# Patient Record
Sex: FEMALE | Race: WHITE | Employment: STUDENT | URBAN - METROPOLITAN AREA
[De-identification: names, ages, dates, MRNs, and addresses within clinical notes are randomized per-mention and may not be internally consistent; named-entity substitution may affect disease eponyms.]

---

## 2020-01-09 ENCOUNTER — OFFICE VISIT (OUTPATIENT)
Dept: ORTHOPEDIC SURGERY | Age: 40
End: 2020-01-09

## 2020-01-09 VITALS
TEMPERATURE: 97.3 F | HEIGHT: 67 IN | RESPIRATION RATE: 16 BRPM | BODY MASS INDEX: 28.6 KG/M2 | SYSTOLIC BLOOD PRESSURE: 104 MMHG | OXYGEN SATURATION: 100 % | DIASTOLIC BLOOD PRESSURE: 72 MMHG | HEART RATE: 63 BPM | WEIGHT: 182.2 LBS

## 2020-01-09 DIAGNOSIS — M25.512 ACUTE PAIN OF LEFT SHOULDER: Primary | ICD-10-CM

## 2020-01-09 NOTE — PROGRESS NOTES
1. Have you been to the ER, urgent care clinic since your last visit? Hospitalized since your last visit? No    2. Have you seen or consulted any other health care providers outside of the [address] [place] [address] since your last visit? Include any pap smears or colon screening.  No

## 2020-01-09 NOTE — PATIENT INSTRUCTIONS
If we order a Diagnostic test (such as MRI or CT) during your office visit please see below:     Coordination of Care will be calling you to schedule your diagnostic test. If you have not heard from Coordination of Care within 5 business days, please call [phone]. Once you have a date scheduled for your diagnostic test, you will need to contact our office to schedule a follow up appointment, as this is when the physician will review your diagnostic test results with you. You can contact our office to schedule appointment by phone at [phone], or you can send a message via [facility] to request an appointment.

## 2020-01-09 NOTE — PROGRESS NOTES
Joaquín De La O  1980   Chief Complaint   Patient presents with    Shoulder Pain     left shoulder pain        HISTORY OF PRESENT ILLNESS  Joaquín De La O is a 46 y.o. female who presents today for evaluation of left shoulder. She states she has had a painful popping sensation in her shoulder x 2 years. Does not she dislocated her shoulder when she was a teenager. It hurts when she raises her arm and she can no longer work out secondary to the pain. Patient describes the pain as sharp, tearing, stabbing and popping that is Intermittent in nature. Symptoms are worse with exercise, lifting and carrying and is better with  rest.  Associated symptoms include weakness. Since problem started, it: has worsened. Pain does wake patient up at night. Has taken ibuprofen and has done shoulder therapy exercises for the problem. Pain is a 4/10. Has tried following treatments: Injections:NO; Brace:NO; Therapy:YES; Cane/Crutch:NO       Allergies   Allergen Reactions    Amoxicillin Rash        History reviewed. No pertinent past medical history.    Social History     Socioeconomic History    Marital status: UNKNOWN     Spouse name: Not on file    Number of children: Not on file    Years of education: Not on file    Highest education level: Not on file   Occupational History    Not on file   Social Needs    Financial resource strain: Not on file    Food insecurity:     Worry: Not on file     Inability: Not on file    Transportation needs:     Medical: Not on file     Non-medical: Not on file   Tobacco Use    Smoking status: Never Smoker    Smokeless tobacco: Never Used   Substance and Sexual Activity    Alcohol use: Not on file    Drug use: Not on file    Sexual activity: Not on file   Lifestyle    Physical activity:     Days per week: Not on file     Minutes per session: Not on file    Stress: Not on file   Relationships    Social connections:     Talks on phone: Not on file     Gets together: Not on file     Attends Synagogue service: Not on file     Active member of club or organization: Not on file     Attends meetings of clubs or organizations: Not on file     Relationship status: Not on file    Intimate partner violence:     Fear of current or ex partner: Not on file     Emotionally abused: Not on file     Physically abused: Not on file     Forced sexual activity: Not on file   Other Topics Concern    Not on file   Social History Narrative    Not on file      Past Surgical History:   Procedure Laterality Date    HX ACL RECONSTRUCTION        Family History   Problem Relation Age of Onset    No Known Problems Mother     No Known Problems Father       No current outpatient medications on file. No current facility-administered medications for this visit. REVIEW OF SYSTEM   Patient denies: Weight loss, Fever/Chills, HA, Visual changes, Fatigue, Chest pain, SOB, Abdominal pain, N/V/D/C, Blood in stool or urine, Edema. Pertinent positive as above in HPI. All others were negative    PHYSICAL EXAM:   Visit Vitals  /72 (BP 1 Location: Right arm, BP Patient Position: Sitting)   Pulse 63   Temp 97.3 °F (36.3 °C) (Oral)   Resp 16   Ht 5' 6.5\" (1.689 m)   Wt 182 lb 3.2 oz (82.6 kg)   SpO2 100%   BMI 28.97 kg/m²     The patient is a well-developed, well-nourished female   in no acute distress. The patient is alert and oriented times three. The patient is alert and oriented times three. Mood and affect are normal.  LYMPHATIC: lymph nodes are not enlarged and are within normal limits  SKIN: normal in color and non tender to palpation. There are no bruises or abrasions noted. NEUROLOGICAL: Motor sensory exam is within normal limits. Reflexes are equal bilaterally.  There is normal sensation to pinprick and light touch  MUSCULOSKELETAL:  Examination Left shoulder   Skin Intact   AC joint tenderness -   Biceps tenderness -   Forward flexion/Elevation    Active abduction  Glenohumeral abduction 90   External rotation ROM 90   Internal rotation ROM 70   Apprehension -   Cristines Relocation -   Jerk -   Load and Shift -   Obriens +   Speeds +   Impingement sign -   Supraspinatus/Empty Can -, 5/5   External Rotation Strength -, 5/5   Lift Off/Belly Press -, 5/5   Neurovascular Intact         IMAGING: 3 view xray images of left shoulder taken in office on 1/9/2020 read and reviewed by myself reveal no acute abnormalities     IMPRESSION:      ICD-10-CM ICD-9-CM    1. Acute pain of left shoulder M25.512 719.41 AMB POC XRAY, SHOULDER; COMPLETE, 2+      MRI SHOULDER LT W CONT      XR INJ SHOULDER LT PRE CT/MRI ARTHRO        PLAN:   1. Patient with shoulder pain with concerns for labral tear. Will order MRI arthrogram r/o labral tear  Risk factors include: n/a  2. No cortisone injection indicated today   3. No Physical/Occupational Therapy indicated today  4. Yes diagnostic test indicated today:   5. No durable medical equipment indicated today  6. No referral indicated today   7. No medications indicated today:   8.  No Narcotic indicated today    RTC after MRI     BARRY [facility]

## 2020-01-21 ENCOUNTER — HOSPITAL ENCOUNTER (OUTPATIENT)
Dept: GENERAL RADIOLOGY | Age: 40
Discharge: HOME | End: 2020-01-21
Attending: PHYSICIAN ASSISTANT
Payer: COMMERCIAL

## 2020-01-21 ENCOUNTER — HOSPITAL ENCOUNTER (OUTPATIENT)
Dept: MRI IMAGING | Age: 40
Discharge: HOME | End: 2020-01-21
Attending: PHYSICIAN ASSISTANT
Payer: COMMERCIAL

## 2020-01-21 DIAGNOSIS — M25.512 ACUTE PAIN OF LEFT SHOULDER: ICD-10-CM

## 2020-01-21 PROCEDURE — 73222 MRI JOINT UPR EXTREM W/DYE: CPT

## 2020-01-21 PROCEDURE — 77002 NEEDLE LOCALIZATION BY XRAY: CPT

## 2020-01-21 PROCEDURE — 74011636320 HC RX REV CODE- 636/320: Performed by: PHYSICIAN ASSISTANT

## 2020-01-21 PROCEDURE — 74011250636 HC RX REV CODE- 250/636: Performed by: PHYSICIAN ASSISTANT

## 2020-01-21 PROCEDURE — 74011000250 HC RX REV CODE- 250: Performed by: PHYSICIAN ASSISTANT

## 2020-01-21 PROCEDURE — A9575 INJ GADOTERATE MEGLUMI 0.1ML: HCPCS | Performed by: PHYSICIAN ASSISTANT

## 2020-01-21 RX ORDER — SODIUM CHLORIDE 9 MG/ML
3 INJECTION INTRAMUSCULAR; INTRAVENOUS; SUBCUTANEOUS
Status: COMPLETED | OUTPATIENT
Start: 2020-01-21 | End: 2020-01-21

## 2020-01-21 RX ORDER — GADOTERATE MEGLUMINE 376.9 MG/ML
20 INJECTION INTRAVENOUS
Status: COMPLETED | OUTPATIENT
Start: 2020-01-21 | End: 2020-01-21

## 2020-01-21 RX ORDER — LIDOCAINE HYDROCHLORIDE 10 MG/ML
10 INJECTION, SOLUTION EPIDURAL; INFILTRATION; INTRACAUDAL; PERINEURAL
Status: COMPLETED | OUTPATIENT
Start: 2020-01-21 | End: 2020-01-21

## 2020-01-21 RX ADMIN — IOHEXOL 50 ML: 240 INJECTION, SOLUTION INTRATHECAL; INTRAVASCULAR; INTRAVENOUS; ORAL at 11:40

## 2020-01-21 RX ADMIN — SODIUM CHLORIDE 10 ML: 9 INJECTION, SOLUTION INTRAMUSCULAR; INTRAVENOUS; SUBCUTANEOUS at 11:40

## 2020-01-21 RX ADMIN — GADOTERATE MEGLUMINE 20 ML: 376.9 INJECTION INTRAVENOUS at 11:41

## 2020-01-21 RX ADMIN — LIDOCAINE HYDROCHLORIDE 5 ML: 10 INJECTION, SOLUTION EPIDURAL; INFILTRATION; INTRACAUDAL; PERINEURAL at 11:39

## 2020-02-06 ENCOUNTER — TELEPHONE (OUTPATIENT)
Dept: ORTHOPEDIC SURGERY | Age: 40
End: 2020-02-06

## 2020-02-06 NOTE — TELEPHONE ENCOUNTER
Patient called very frustrated as she has seen the MRI results are final in \"my chart\", however, nobody has contacted her. She declined to schedule a follow up appointment and is instead requesting a call back. Please contact patient at [phone].

## 2020-02-12 NOTE — TELEPHONE ENCOUNTER
It is our policy that patient schedule a follow up after MRI as treatments are better discussed in person.

## 2020-02-20 ENCOUNTER — OFFICE VISIT (OUTPATIENT)
Dept: ORTHOPEDIC SURGERY | Age: 40
End: 2020-02-20

## 2020-02-20 VITALS
BODY MASS INDEX: 28.35 KG/M2 | DIASTOLIC BLOOD PRESSURE: 79 MMHG | HEART RATE: 79 BPM | HEIGHT: 67 IN | RESPIRATION RATE: 16 BRPM | OXYGEN SATURATION: 96 % | TEMPERATURE: 98.1 F | WEIGHT: 180.6 LBS | SYSTOLIC BLOOD PRESSURE: 113 MMHG

## 2020-02-20 DIAGNOSIS — M75.122 COMPLETE TEAR OF LEFT ROTATOR CUFF, UNSPECIFIED WHETHER TRAUMATIC: Primary | ICD-10-CM

## 2020-02-20 NOTE — PROGRESS NOTES
1. Have you been to the ER, urgent care clinic since your last visit? Hospitalized since your last visit? Yes When: January 29,2020 Where: [facility] Reason for visit: Cramping     2. Have you seen or consulted any other health care providers outside of the [address] [place] [address] since your last visit? Include any pap smears or colon screening.  No

## 2020-02-20 NOTE — PROGRESS NOTES
Aleksandra Schneider  1980   Chief Complaint   Patient presents with    Shoulder Pain     left shoulder pain        HISTORY OF PRESENT ILLNESS  Aleksandra Schneider is a 46 y.o. female who presents today for reevaluation of left shoulder pain and MRI review. Patient rates pain as 0/10 today. She states she has had a painful popping sensation in her shoulder x 2 years. Pt is fairly active and likes to lift weights, states she may have injured her shoulder while lifting weights. Does note she dislocated her shoulder when she was a teenager while surfing. It hurts when she raises her arm and she can no longer work out secondary to the pain. Pain at night. Pt reports that she was in the ER last week and was told she injured her L5. She also reports a hx of a cervical herniated disc. She has a follow-up appointment scheduled with a neurosurgeon. Surgery was discussed with the patient today and they would like to move forward with scheduling surgery. Patient denies any fever, chills, chest pain, shortness of breath or calf pain. The remainder of the review of systems is negative. There are no new illness or injuries to report since last seen in the office. There are no changes to medications, allergies, family or social history. PHYSICAL EXAM:   Visit Vitals  /79 (BP 1 Location: Right arm, BP Patient Position: Sitting)   Pulse 79   Temp 98.1 °F (36.7 °C) (Oral)   Resp 16   Ht 5' 6.5\" (1.689 m)   Wt 180 lb 9.6 oz (81.9 kg)   SpO2 96%   BMI 28.71 kg/m²     The patient is a well-developed, well-nourished female   in no acute distress. The patient is alert and oriented times three. The patient is alert and oriented times three. Mood and affect are normal.  LYMPHATIC: lymph nodes are not enlarged and are within normal limits  SKIN: normal in color and non tender to palpation. There are no bruises or abrasions noted. NEUROLOGICAL: Motor sensory exam is within normal limits. Reflexes are equal bilaterally. There is normal sensation to pinprick and light touch  MUSCULOSKELETAL:  Examination Left shoulder   Skin Intact   AC joint tenderness -   Biceps tenderness -   Forward flexion/Elevation    Active abduction    Glenohumeral abduction 90   External rotation ROM 60   Internal rotation ROM 45   Apprehension -   Cristines Relocation -   Jerk -   Load and Shift -   Obriens +   Speeds +   Impingement sign +   Supraspinatus/Empty Can -, 5/5   External Rotation Strength -, 5/5   Lift Off/Belly Press -, 5/5   Neurovascular Intact        IMAGING: MRI arthrogram of left shoulder dated 1/21/2020 reviewed and read by Dr. Martina Caruso:  1. Tendinosis of the left shoulder rotator cuff with focal area of  full-thickness tendon tear involving the anterior fibers of the supraspinatus  tendon. No significant tendon retraction. Normal rotator cuff muscle bulk and  signal.  2. Superior labral anterior-posterior tear. 3. Mild degenerative changes of the coracoid clavicular joint to include mild  pericapsular edema and small joint effusion. 4. Small quantity of fluid throughout the subacromial-subdeltoid bursa as can be  seen with symptoms referrable to bursitis. 3 view xray images of left shoulder taken in office on 1/9/2020 read and reviewed by MARK Montez, reveal: No acute abnormalities     IMPRESSION:      ICD-10-CM ICD-9-CM    1. Complete tear of left rotator cuff, unspecified whether traumatic M75.122 727.61         PLAN:   1. I discussed the risks and benefits and potential adverse outcomes of both operative vs non operative treatment of left complete rotator cuff tear with the patient. Patient wishes to proceed with arthroscopic left rotator cuff repair.      Risks of operative intervention include but not limited to bleeding, infection, deep vein thrombosis, pulmonary embolism, death, limb length discrepancy, reflexive sympathetic dystrophy, fat embolism syndrome,damage to blood vessels and nerves, malunion, non-union, delayed union, failure of hardware, post traumatic arthritis, stroke, heart attack, and death. Patient understands that infection may arise and may require numerous surgeries. History and physical exam scheduled for a later date. Risk factors include: n/a  2. No ultrasound exam indicated today  3. No cortisone injection indicated today   4. No Physical/Occupational Therapy indicated today  5. No diagnostic test indicated today:   6. No durable medical equipment indicated today  7. No referral indicated today   8. No medications indicated today:   9. No Narcotic indicated today       RTC H&P      Scribed by Charles Parson [address]) as dictated by Justin Bell MD    I, Dr. Justin Bell, confirm that all documentation is accurate.     Justin Bell M.D.   [facility]

## 2020-02-28 DIAGNOSIS — M75.122 COMPLETE TEAR OF LEFT ROTATOR CUFF, UNSPECIFIED WHETHER TRAUMATIC: Primary | ICD-10-CM

## 2020-03-03 ENCOUNTER — OFFICE VISIT (OUTPATIENT)
Dept: ORTHOPEDIC SURGERY | Age: 40
End: 2020-03-03

## 2020-03-03 VITALS
WEIGHT: 178 LBS | OXYGEN SATURATION: 98 % | DIASTOLIC BLOOD PRESSURE: 67 MMHG | SYSTOLIC BLOOD PRESSURE: 105 MMHG | RESPIRATION RATE: 16 BRPM | HEIGHT: 67 IN | BODY MASS INDEX: 27.94 KG/M2 | TEMPERATURE: 97.3 F | HEART RATE: 82 BPM

## 2020-03-03 DIAGNOSIS — M75.122 COMPLETE TEAR OF LEFT ROTATOR CUFF, UNSPECIFIED WHETHER TRAUMATIC: Primary | ICD-10-CM

## 2020-03-03 RX ORDER — GABAPENTIN 100 MG/1
100 CAPSULE ORAL 3 TIMES DAILY
COMMUNITY

## 2020-03-03 RX ORDER — GABAPENTIN 300 MG/1
300 CAPSULE ORAL
Qty: 5 CAP | Refills: 0 | Status: SHIPPED | OUTPATIENT
Start: 2020-03-03 | End: 2020-03-03 | Stop reason: CLARIF

## 2020-03-03 RX ORDER — OXYCODONE HYDROCHLORIDE 5 MG/1
5 TABLET ORAL
Qty: 40 TAB | Refills: 0 | Status: SHIPPED | OUTPATIENT
Start: 2020-03-03 | End: 2020-03-10

## 2020-03-03 NOTE — PROGRESS NOTES
HISTORY AND PHYSICAL          Patient: Blanquita Colvin                MRN: [id number]       SSN: [id number]  YOB: 1980          AGE: 46 y.o. SEX: female      Patient scheduled for:  Left shoulder arthroscopic rotator cuff repair    Surgeon: Julio César Cueva MD    ANESTHESIA TYPE:  General    HISTORY:     The patient was seen in the office today for a preoperative history and physical for an upcoming above listed surgery. The patient is a pleasant 46 y.o. female who has a history of left shoulder pain. Patient rates pain as 0/10 today. She states she has had a painful popping sensation in her shoulder x 2 years. Pt is fairly active and likes to lift weights, states she may have injured her shoulder while lifting weights. Does note she dislocated her shoulder when she was a teenager while surfing. It hurts when she raises her arm and she can no longer work out secondary to the pain. Pain at night. Due to the current findings, affected activity of daily living and continued pain and discomfort, surgical intervention is indicated. The alternatives, risks, and complications, including but not limited to infection, blood loss, need for blood transfusion, neurovascular damage, jaya-incisional numbness, subcutaneous hematoma, bone fracture, anesthetic complications, DVT, PE, death, RSD, postoperative stiffness and pain, possible surgical scar, delayed healing and nonhealing, reflexive sympathetic dystrophy, damage to blood vessels and nerves, need for more surgery, MI, and stroke,  failure of hardware, gait disturbances,have been discussed. The patient understands and wishes to proceed with surgery. PAST MEDICAL HISTORY:     History reviewed. No pertinent past medical history.     CURRENT MEDICATIONS:         ALLERGIES:     Allergies   Allergen Reactions    Amoxicillin Rash         SURGICAL HISTORY:     Past Surgical History:   Procedure Laterality Date    HX ACL RECONSTRUCTION SOCIAL HISTORY:     Social History     Socioeconomic History    Marital status: UNKNOWN     Spouse name: Not on file    Number of children: Not on file    Years of education: Not on file    Highest education level: Not on file   Tobacco Use    Smoking status: Never Smoker    Smokeless tobacco: Never Used       FAMILY HISTORY:     Family History   Problem Relation Age of Onset    No Known Problems Mother     No Known Problems Father        REVIEW OF SYSTEMS:     Negative for fevers, chills, chest pain, shortness of breath, weight loss, recent illness     General: Negative for fever and chills. No unexpected change in weight. Denies fatigue. No change in appetite. Skin: Negative for rash or itching. HEENT: Negative for congestion, sore throat, neck pain and neck stiffness. No change in vision or hearing. Hasn't noted any enlarged lymph nodes in the neck. Cardiovascular:  Negative for chest pain and palpitations. Has not noted pedal edema. Respiratory: Negative for cough, colds, sinus, hemoptysis, shortness of breath and wheezing. Gastrointestinal: Negative for nausea and vomiting, rectal bleeding, coffee ground emesis, abdominal pain, diarrhea and constipation. Genitourinary: Negative for dysuria, frequency urgency, or burning on micturition. No flank pain, no foul smelling urine, no difficulty with initiating urination. Hematological: Negative for bleeding or easy bruising. Musculoskeletal: Negative  for arthralgias, back pain or neck pain. Neurological: Negative for dizziness, seizures or syncopal episodes. Denies headaches. Endocrine: Denies excessive thirst.  No heat/cold intolerance. Psychiatric: Negative for depression or insomnia.     PHYSICAL EXAMINATION:     VITALS:   Visit Vitals  /67 (BP 1 Location: Left arm, BP Patient Position: Sitting)   Pulse 82   Temp 97.3 °F (36.3 °C) (Oral)   Resp 16   Ht 5' 6.5\" (1.689 m)   Wt 178 lb (80.7 kg)   SpO2 98%   BMI 28.30 kg/m² GEN:  Well developed, well nourished 46 y.o. female in no acute distress. HEENT: Normocephalic and atraumatic. Eyes: Conjunctivae and EOM are normal.Pupils are equal, round, and reactive to light. External ear normal appearance, external nose normal appearing. Mouth/Throat: Oropharynx is clear and moist, able to handle oral secretions w/out difficulty, airway patent  NECK: Supple. Normal ROM, No lymphadenopathy. Trachea is midline. No bruising, swelling or deformity  RESP: Clear to auscultation bilaterally. No wheezes, rales, rhonchi. Normal effort and breath sounds. No respiratory distress  CARDIO:  Normal rate, regular rhythm and normal heart sounds. No MGR. ABDOMEN: Soft, non-tender, non-distended, normoactive bowel sounds in all four quadrants. There is no tenderness. There is no rebound and no guarding. BACK: No CVA or spinal tenderness  BREAST:  Deferred  PELVIC:    Deferred   RECTAL:  Deferred   :           Deferred  EXTREMITIES: EXAMINATION OF: left shoulder  Examination Left shoulder   Skin Intact   AC joint tenderness -   Biceps tenderness -   Forward flexion/Elevation    Active abduction    Glenohumeral abduction 90   External rotation ROM 60   Internal rotation ROM 45   Apprehension -   Cristines Relocation -   Jerk -   Load and Shift -   Obriens +   Speeds +   Impingement sign +   Supraspinatus/Empty Can -, 5/5   External Rotation Strength -, 5/5   Lift Off/Belly Press -, 5/5   Neurovascular Intact           NEUROVASCULAR: Sensation intact to light touch and strength grossly intact and symmetrical. No nystagmus. Positive distal pulses and capillary refill. DVT ASSESSMENT:  There is not  calf tenderness. No evidence of DVT seen on physical exam.  MOTOR: In tact  PSYCH: Alert an oriented to person, place and time.  Mood, memory, affect, behavior and judgment normal       RADIOGRAPHS & DIAGNOSTIC STUDIES:     MRI/xray reveals : IMAGING: MRI arthrogram of left shoulder dated 1/21/2020 reviewed and read by Dr. Valdemar Johnson:  1. Tendinosis of the left shoulder rotator cuff with focal area of  full-thickness tendon tear involving the anterior fibers of the supraspinatus  tendon. No significant tendon retraction. Normal rotator cuff muscle bulk and  signal.  2. Superior labral anterior-posterior tear. 3. Mild degenerative changes of the coracoid clavicular joint to include mild  pericapsular edema and small joint effusion. 4. Small quantity of fluid throughout the subacromial-subdeltoid bursa as can be  seen with symptoms referrable to bursitis.    3 view xray images of left shoulder taken in office on 2020 read and reviewed by MARK Perez, reveal: No acute abnormalities         LABS:     None needed      ASSESSMENT:       Encounter Diagnosis   Name Primary?  Complete tear of left rotator cuff, unspecified whether traumatic Yes       PLAN:     Again, the alternatives, risks, and complications, as well as expected outcome were discussed. The patient understands and agrees to proceed with left shoulder arthroscopic rotator cuff repair.  Patient given orders listed below:    Orders Placed This Encounter    gabapentin (NEURONTIN) 100 mg capsule    oxyCODONE IR (ROXICODONE) 5 mg immediate release tablet    DISCONTD: gabapentin (NEURONTIN) 300 mg capsule         Avinash Samayoa PA-C  3/3/2020  8:38 AM

## 2020-03-17 ENCOUNTER — PATIENT MESSAGE (OUTPATIENT)
Dept: ORTHOPEDIC SURGERY | Age: 40
End: 2020-03-17

## 2020-03-17 ENCOUNTER — HOSPITAL ENCOUNTER (OUTPATIENT)
Dept: PHYSICAL THERAPY | Age: 40
Discharge: HOME | End: 2020-03-17
Payer: COMMERCIAL

## 2020-03-17 PROCEDURE — 97161 PT EVAL LOW COMPLEX 20 MIN: CPT

## 2020-03-17 PROCEDURE — 97140 MANUAL THERAPY 1/> REGIONS: CPT

## 2020-03-17 NOTE — PROGRESS NOTES
[address] [place] [address] PHYSICAL THERAPY AT [facility] [address] [place], [address] [facility] [address] - Phone: [phone]  Fax: [phone] / [address] [facility]  Patient Name: Cj Frausto : 1980   Treatment   Diagnosis: L shoulder pain Medical   Diagnosis: Left shoulder pain [M25.512]   Onset Date: 3/16/2020     Referral Source: [facility]): 3/17/2020   Prior Hospitalization: See medical history Provider #: [id number]   Prior Level of Function: Pt had chronic shoulder pain with ADLs. Comorbidities: None reported   Medications: Verified on Patient Summary List   The Plan of Care and following information is based on the information from the initial evaluation.   ==================================================================================  Assessment / key information:  Pt is a 45 yo female s/p L rotator cuff repair on 3/16/2020. He/she presents with pain ranging from 1-10/10, located L shoulder. Pain is made worse with using the arm, better with rest. GHJ PROM: flexion 90, [id number], ER at 0: 65.  Pt is limited in the following activities: using the L shoulder. Pt will benefit from PT interventions to address the aforementioned deficits and allow pt to return to OF.   Eval Complexity: History LOW Complexity : Zero comorbidities / personal factors that will impact the outcome / POC;  Examination  LOW Complexity : 1-2 Standardized tests and measures addressing body structure, function, activity limitation and / or participation in recreation ; Presentation LOW Complexity : Stable, uncomplicated ;  Decision Making MEDIUM Complexity : FOTO score of 26-74; Overall Complexity LOW    ==================================================================================  Problem List: pain affecting function, decrease ROM, decrease strength, impaired gait/ balance and decrease ADL/ functional yunes   Treatment Plan may include any combination of the following: Therapeutic exercise, Therapeutic activities, Neuromuscular re-education, Physical agent/modality, Manual therapy, Patient education and Self Care training  Patient / Family readiness to learn indicated by: asking questions, trying to perform skills and interest  Persons(s) to be included in education: patient (P)  Barriers to Learning/Limitations: no  Measures taken:    Patient Goal (s): \"recover\"   Patient self reported health status: good  Rehabilitation Potential: excellent   Short Term Goals: To be accomplished in  3  weeks:  1. Pt will be independent and compliant with HEP to decrease pain, increase ROM and return pt to PLOF. 2. PROM flexion >120deg     Long Term Goals: To be accomplished in  6  weeks:  1. Increase score on FOTO to > or = 60 to demo an increase in functional activity tolerance with the UE. 2.  Pt will note < or = 2/10 pain with all mobility to improve comfort with ADLs. 3. Pt to demonstrate a GROC score of >/= +5 to show overall improvement in function  Frequency / Duration:   Patient to be seen  3  times per week for 6  weeks:  Patient / Caregiver education and instruction: self care, activity modification and exercises    Therapist Signature: Terry Luciano, PT Date: 4/67/6814   Certification Period: - Time: 12:35 PM   ===========================================================================================  I certify that the above Physical Therapy Services are being furnished while the patient is under my care. I agree with the treatment plan and certify that this therapy is necessary. Physician Signature:        Date:       Time:     Please sign and return to [facility] or you may fax the signed copy to [phone]. Thank you.

## 2020-03-17 NOTE — PROGRESS NOTES
PHYSICAL THERAPY - DAILY TREATMENT NOTE    Patient Name: Elaina Conti        Date: 3/17/2020  : 1980    Patient  Verified: YES  Visit #:     Insurance: Payor: [address] [place] [address] Road / Plan: [address] / Product Type: Managed Care Medicaid /      In time: 12:00 Out time: 12:45   Total Treatment Time: 45     Medicare Time Tracking (below)   Total Timed Codes (min):  - 1:1 Treatment Time:  -     TREATMENT AREA/ DIAGNOSIS = Left shoulder pain [M25.512]    SUBJECTIVE  Pain Level (on 0 to 10 scale):  0  / 10   Medication Changes/New allergies or changes in medical history, any new surgeries or procedures?     NO    If yes, update Summary List   Subjective Functional Status/Changes:  []  No changes reported     See Eval      OBJECTIVE  Modalities Rationale:     decrease inflammation and decrease pain to improve patient's ability to perform ADLs without pain     min [] Estim, type/location:                                      []  att     []  unatt     []  w/US     []  w/ice    []  w/heat    min []  Mechanical Traction: type/lbs                   []  pro   []  sup   []  int   []  cont    []  before manual    []  after manual    min []  Ultrasound, settings/location:      min []  Iontophoresis w/ dexamethasone, location:                                               []  take home patch       []  in clinic   10 min [x]  Ice     []  Heat    location/position: L shoulder    min []  Vasopneumatic Device, press/temp:     min []  Other:    [] Skin assessment post-treatment (if applicable):    []  intact    []  redness- no adverse reaction     []redness  adverse reaction:        10 min Manual Therapy: PROM to L shoulder   Rationale:      increase ROM and increase tissue extensibility to improve patient's ability to perform ADLs without pain    Billed With/As:   [x] TE   [] TA   [] Neuro   [] Self Care Patient Education: [x] Review HEP    [] Progressed/Changed HEP based on:   [] positioning   [] body mechanics   [] transfers   [] heat/ice application    [] other:        Other Objective/Functional Measures:    See Eval   Post Treatment Pain Level (on 0 to 10) scale:   0  / 10     ASSESSMENT  Assessment/Changes in Function:     See Eval     []  See Progress Note/Recertification   Patient will continue to benefit from skilled PT services to modify and progress therapeutic interventions, address functional mobility deficits, address ROM deficits, address strength deficits and analyze and address soft tissue restrictions to attain remaining goals.    Progress toward goals / Updated goals:    See Eval     PLAN  [x]  Upgrade activities as tolerated YES Continue plan of care   []  Discharge due to :    []  Other:      Therapist: Zackary Malave DPT     Date: 3/17/2020 Time: 12:36 PM        Future Appointments   Date Time Provider Marsha Bill   3/23/2020  1:30 PM MARK Jaquez Mehrdad [address]

## 2020-03-17 NOTE — TELEPHONE ENCOUNTER
From: Rachel Orosco  To: Paula Rodriguez MD  Sent: 3/17/2020 8:54 AM EDT  Subject: Visit Follow-Up Question    I feel really good this morning. I am looking forward to PT. My question was I'm trying to let my arm relax, but I'm naturally tensing up. ...is there any way to lessen that? Maybe after today's PT it will help. Thought I'd ask though.

## 2020-03-19 ENCOUNTER — HOSPITAL ENCOUNTER (OUTPATIENT)
Dept: PHYSICAL THERAPY | Age: 40
Discharge: HOME | End: 2020-03-19
Payer: COMMERCIAL

## 2020-03-19 PROCEDURE — 97140 MANUAL THERAPY 1/> REGIONS: CPT

## 2020-03-19 NOTE — PROGRESS NOTES
PHYSICAL THERAPY - DAILY TREATMENT NOTE    Patient Name: Chandan Pearce        Date: 3/19/2020  : 1980    Patient  Verified: YES  Visit #:   2   of   12  Insurance: Payor: [address] / Plan: [address] / Product Type: Managed Care Medicaid /      In time: 8:50 Out time: 9:25   Total Treatment Time: 35     Medicare Time Tracking (below)   Total Timed Codes (min):  - 1:1 Treatment Time:  -     TREATMENT AREA/ DIAGNOSIS = Left shoulder pain [M25.512]    SUBJECTIVE  Pain Level (on 0 to 10 scale):  1  / 10   Medication Changes/New allergies or changes in medical history, any new surgeries or procedures? NO    If yes, update Summary List   Subjective Functional Status/Changes:  []  No changes reported     Pt reports that she took the pillow off the sling.  Pt states she is still keeping the sling on at all times      OBJECTIVE  Modalities Rationale:     decrease inflammation and decrease pain to improve patient's ability to perform ADLs without pain     min [] Estim, type/location:                                      []  att     []  unatt     []  w/US     []  w/ice    []  w/heat    min []  Mechanical Traction: type/lbs                   []  pro   []  sup   []  int   []  cont    []  before manual    []  after manual    min []  Ultrasound, settings/location:      min []  Iontophoresis w/ dexamethasone, location:                                               []  take home patch       []  in clinic   10 min [x]  Ice     []  Heat    location/position: L shoulder    min []  Vasopneumatic Device, press/temp:     min []  Other:    [] Skin assessment post-treatment (if applicable):    []  intact    []  redness- no adverse reaction     []redness  adverse reaction:        25 min Manual Therapy: PROM to L shoulder   Rationale:      increase ROM and increase tissue extensibility to improve patient's ability to perform ADLs without pain    Billed With/As:   [x] TE   [] TA   [] Neuro [] Self Care Patient Education: [x] Review HEP    [] Progressed/Changed HEP based on:   [] positioning   [] body mechanics   [] transfers   [] heat/ice application    [] other:        Other Objective/Functional Measures:    Pt PROM is [facility]   Post Treatment Pain Level (on 0 to 10) scale:   1  / 10     ASSESSMENT  Assessment/Changes in Function:     Pt had little guarding with PROM     []  See Progress Note/Recertification   Patient will continue to benefit from skilled PT services to modify and progress therapeutic interventions, address functional mobility deficits, address ROM deficits, address strength deficits and analyze and address soft tissue restrictions to attain remaining goals.    Progress toward goals / Updated goals:    Good Progress to    [] STG    [] LTG  1 as shown by improved mobility needed for ADLs     PLAN  [x]  Upgrade activities as tolerated YES Continue plan of care   []  Discharge due to :    []  Other:      Therapist: Cathy Sanabria DPT     Date: 3/19/2020 Time: 8:28 AM        Future Appointments   Date Time Provider Marsha Bill   3/19/2020  9:00 AM Patrick Been [facility]   3/20/2020 10:00 AM Natasha Lopes PTA [facility]   3/23/2020  8:00 AM Natasha Lopes PTA [facility]   3/23/2020  1:30 PM MARK Silva   3/25/2020 10:30 AM Patrick Been [facility]   3/27/2020  8:30 AM Natasha Lopes PTA [facility]   3/30/2020  9:00 AM Natasha Lopes PTA [facility]   4/1/2020 11:30 AM Patrick Been [facility]   4/3/2020 12:30 PM Patrick Been [facility]   4/6/2020 10:30 AM Patrick Been [facility]   4/8/2020 12:00 PM Patrick Been [facility]   4/10/2020  1:00 PM Patrick Been [facility]   4/13/2020 12:00 PM Patrick Been [facility]   4/15/2020 11:30 AM Patrick Been [facility]   4/17/2020  9:00 AM Natasha Lopes PTA [facility]   4/20/2020 11:00 AM Patrick Been [facility]   4/22/2020 11:30 AM Patrick Been [facility]   4/24/2020 12:30 PM Luis E Brown [facility] AT [facility]

## 2020-03-20 ENCOUNTER — HOSPITAL ENCOUNTER (OUTPATIENT)
Dept: PHYSICAL THERAPY | Age: 40
Discharge: HOME | End: 2020-03-20
Payer: COMMERCIAL

## 2020-03-20 PROCEDURE — 97140 MANUAL THERAPY 1/> REGIONS: CPT

## 2020-03-20 NOTE — PROGRESS NOTES
[facility] PHYSICAL THERAPY AT [address], [address] [place] [address], [address], [address] [place] [address] - Phone: [phone]  Fax: [phone]  PROGRESS NOTE  Patient Name: Cher Miranda : 1980   Treatment/Medical Diagnosis: Left shoulder pain [M25.512]   Referral Source: Shimon Bryson,*     Date of Initial Visit: 3/17/20 Attended Visits: 3 Missed Visits:      SUMMARY OF TREATMENT  Therapeutic ex including strengthening, ROM, flexibility, stabilization, manual therapy including: Patient education, HEP  CURRENT STATUS  Pt is making good  progress in therapy. Demonstrates PROM of (L) shld: Flexion 110 deg, Abd: 90 deg, ER: 45 deg at 45deg abd, IR: 70 deg, Pain is rated as 0-3/10. Pt reports con't use of meds for pain control, use of sling for (L) shld s/s. Goal/Measure of Progress Goal Met?   1.  1. Pt will be independent and compliant with HEP to decrease pain, increase ROM and return pt to PLOF. Status at last Eval: Dependent  Current Status: Independent yes   2.  2. PROM flexion >120deg   Status at last Eval: 90 deg Current Status: 110 deg yes     New Goals to be achieved in __3-4__  weeks:  1. Increase score on FOTO to > or = 60 to demo an increase in functional activity tolerance with the UE.     2.  2.  Pt will note < or = 2/10 pain with all mobility to improve comfort with ADLs. 3.  3. Pt to demonstrate a GROC score of >/= +5 to show overall improvement in function   RECOMMENDATIONS  Recommend con't PT services to progress RTC ROM and strength per protocol - 2-3x week for 4 weeks   If you have any questions/comments please contact us directly at [phone]. Thank you for allowing us to assist in the care of your patient.     Therapist Signature: Brennon Simmons DPT, CIMT Date: 3/20/2020     Time: 10:47 AM   NOTE TO PHYSICIAN:  PLEASE COMPLETE THE ORDERS BELOW AND FAX TO   [facility]: [phone]  If you are unable to process this request in 24 hours please contact our office: [phone]    ___ I have read the above report and request that my patient continue as recommended.   ___ I have read the above report and request that my patient continue therapy with the following changes/special instructions:_________________________________________________________   ___ I have read the above report and request that my patient be discharged from therapy.      Physician Signature:        Date:       Time:

## 2020-03-20 NOTE — PROGRESS NOTES
PHYSICAL THERAPY - DAILY TREATMENT NOTE      Patient Name: Reny Greco        Date: 3/20/2020  : 1980   YES Patient  Verified  Visit #:   3   of   12  Insurance: Payor: [address] / Plan: [address] / Product Type: Managed Care Medicaid /      In time: 1010 Out time: 248   Total Treatment Time: 35     Medicare Time Tracking (below)   Total Timed Codes (min):   1:1 Treatment Time:       TREATMENT AREA =  Left shoulder pain [M25.512]    SUBJECTIVE    Pain Level (on 0 to 10 scale):  1  / 10   Medication Changes/New allergies or changes in medical history, any new surgeries or procedures?     NO    If yes, update Summary List   Subjective Functional Status/Changes:  []  No changes reported     Reports soreness in (L) shld since last visit      Modalities Rationale:     decrease inflammation and decrease pain to improve patient's ability to perform ADLs   min [] Estim, type/location:                                      []  att     []  unatt     []  w/US     []  w/ice    []  w/heat    min []  Mechanical Traction: type/lbs                   []  pro   []  sup   []  int   []  cont    []  before manual    []  after manual    min []  Ultrasound, settings/location:      min []  Iontophoresis w/ dexamethasone, location:                                               []  take home patch       []  in clinic   10 min [x]  Ice     []  Heat    location/position:     min []  Vasopneumatic Device, press/temp:     min []  Other:    [x] Skin assessment post-treatment (if applicable):    [x]  intact    []  redness- no adverse reaction     []redness  adverse reaction:        25 min Manual Therapy: TPr to (L) post cuff, UT/LS PROM of (L) shld in all planes to patient tolerance    Rationale:      decrease pain, increase ROM, increase tissue extensibility and decrease trigger points to improve patient's ability to perform ADLs      Other Objective/Functional Measures:    Demonstrates increase in (L) shld PROM in all planes    Demonstrates PROM of (L) shld: Flexion 110 deg, Abd: 90 deg, ER: 45 deg at 45deg abd, IR: 70 deg      Post Treatment Pain Level (on 0 to 10) scale:   0 / 10     ASSESSMENT    Assessment/Changes in Function:     Pt to MD on Monday for f/u regarding (L) RTC shoulder      []  See Progress Note/Recertification   Patient will continue to benefit from skilled PT services to modify and progress therapeutic interventions, address functional mobility deficits, address ROM deficits, address strength deficits, analyze and address soft tissue restrictions, analyze and modify body mechanics/ergonomics and assess and modify postural abnormalities to attain remaining goals.       Progress toward goals / Updated goals:    Progressing with overall function and ADLs tolerance, see MD progress note      PLAN    []  Upgrade activities as tolerated YES Continue plan of care   []  Discharge due to :    []  Other:      Therapist: Ivonne Johnson PT    Date: 3/20/2020 Time: 10:42 AM     Future Appointments   Date Time Provider Marsha Bill   3/23/2020  8:00 AM Shiloh Salvador, PTA [facility]ENTER AT [facility]   3/23/2020  1:30 PM MARK Cast Mehrdad [address]   3/25/2020 10:30 AM Ami Pina [facility] AT [facility]   3/27/2020  8:30 AM Shiloh Salvador, PTA [facility] AT [facility]   3/30/2020  9:00 AM Shiloh Salvador, PTA [facility] AT [facility]   4/1/2020 11:30 AM Ami Pina [facility] AT [facility]   4/3/2020 12:30 PM Ami Pina [facility]   4/6/2020 10:30 AM Ami Pina [facility]   4/8/2020 12:00 PM Ami Pina [facility]   4/10/2020  1:00 PM Ami Pina [facility]   4/13/2020 12:00 PM Ami Pina [facility]   4/15/2020 11:30 AM Ami Pina [facility]   4/17/2020  9:00 AM Shiloh Salvador PTA [facility] AT [facility]   4/20/2020 11:00 AM Sussy Pina [facility] AT [facility]   4/22/2020 11:30 AM Sussy Pina [facility] AT [facility]   4/24/2020 12:30 PM Jitendra Celeste [facility] AT [facility]

## 2020-03-23 ENCOUNTER — OFFICE VISIT (OUTPATIENT)
Dept: ORTHOPEDIC SURGERY | Age: 40
End: 2020-03-23

## 2020-03-23 ENCOUNTER — HOSPITAL ENCOUNTER (OUTPATIENT)
Dept: PHYSICAL THERAPY | Age: 40
Discharge: HOME | End: 2020-03-23
Payer: COMMERCIAL

## 2020-03-23 VITALS
SYSTOLIC BLOOD PRESSURE: 91 MMHG | TEMPERATURE: 97.7 F | WEIGHT: 178 LBS | HEART RATE: 67 BPM | RESPIRATION RATE: 19 BRPM | BODY MASS INDEX: 27.94 KG/M2 | OXYGEN SATURATION: 99 % | DIASTOLIC BLOOD PRESSURE: 75 MMHG | HEIGHT: 67 IN

## 2020-03-23 DIAGNOSIS — M75.122 COMPLETE TEAR OF LEFT ROTATOR CUFF, UNSPECIFIED WHETHER TRAUMATIC: Primary | ICD-10-CM

## 2020-03-23 PROCEDURE — 97140 MANUAL THERAPY 1/> REGIONS: CPT

## 2020-03-23 NOTE — PROGRESS NOTES
PHYSICAL THERAPY - DAILY TREATMENT NOTE      Patient Name: Jackelyn Soliz        Date: 3/23/2020  : 1980   YES Patient  Verified  Visit #:   4   of   12  Insurance: Payor: [address] [place] [address] Road / Plan: [address] / Product Type: Managed Care Medicaid /      In time: 7:50 Out time: 8:25   Total Treatment Time: 35     Medicare Time Tracking (below)   Total Timed Codes (min):   1:1 Treatment Time:       TREATMENT AREA =  Left shoulder pain [M25.512]    SUBJECTIVE    Pain Level (on 0 to 10 scale):  0  / 10   Medication Changes/New allergies or changes in medical history, any new surgeries or procedures? NO    If yes, update Summary List   Subjective Functional Status/Changes:  []  No changes reported     \"Minimal to no pain. Able to get some sleep. Not using my arm. To MD this afternoon. \"       Modalities Rationale:     decrease inflammation and decrease pain to improve patient's ability to perform ADLs   min [] Estim, type/location:                                      []  att     []  unatt     []  w/US     []  w/ice    []  w/heat    min []  Mechanical Traction: type/lbs                   []  pro   []  sup   []  int   []  cont    []  before manual    []  after manual    min []  Ultrasound, settings/location:      min []  Iontophoresis w/ dexamethasone, location:                                               []  take home patch       []  in clinic   PD min [x]  Ice     []  Heat    location/position: L Shoulder    min []  Vasopneumatic Device, press/temp:     min []  Other:    [x] Skin assessment post-treatment (if applicable):    [x]  intact    []  redness- no adverse reaction     []redness  adverse reaction:        35 min Manual Therapy: TPr to (L) post cuff, UT/LS PROM of (L) shld in all planes to patient tolerance    Rationale:      decrease pain, increase ROM, increase tissue extensibility and decrease trigger points to improve patient's ability to perform ADLs      Other Objective/Functional Measures:     PROM of (L) shld: Flexion 110 deg, Abd: 90 deg, ER: 45 deg at 45deg abd, IR: 70 deg      Post Treatment Pain Level (on 0 to 10) scale:   0 / 10     ASSESSMENT    Assessment/Changes in Function:     Progressing well overall in ROM with minimal mm guarding and pain. Cont to educate pt on importance of protocol and precautions with good understanding. To MD this afternoon. []  See Progress Note/Recertification   Patient will continue to benefit from skilled PT services to modify and progress therapeutic interventions, address functional mobility deficits, address ROM deficits, address strength deficits, analyze and address soft tissue restrictions, analyze and modify body mechanics/ergonomics and assess and modify postural abnormalities to attain remaining goals. Progress toward goals / Updated goals:    Progressing with overall function and ADLs tolerance within protocol.       PLAN    []  Upgrade activities as tolerated YES Continue plan of care   []  Discharge due to :    []  Other:      Therapist: Elena Paulino PTA    Date: 3/23/2020 Time: 10:42 AM     Future Appointments   Date Time Provider Marsha Bill   3/23/2020  8:00 AM Raul Dyson PTA [facility]   3/23/2020  1:30 PM MARK [facility]   3/25/2020 10:30 AM Jeaneth Halo [facility]   3/27/2020  8:30 AM Raul Dyson PTA [facility] AT [facility]   3/30/2020  9:00 AM Raul Dyson PTA [facility] AT [facility]   4/1/2020 11:30 AM Jeaneth Halo [facility]   4/3/2020 12:30 PM Jeaneth Halo [facility]   4/6/2020 10:30 AM Jeaneth Halo [facility]   4/8/2020 12:00 PM Jeaneth Halo [facility]   4/10/2020  1:00 PM Jeaneth Halo [facility]   4/13/2020 12:00 PM Jeaneth Halo [facility]   4/15/2020 11:30 AM Jeaneth Halo [facility]   4/17/2020  9:00 AM Raul Dyson PTA [facility] AT [facility]   4/20/2020 11:00 AM Jeaneth Halo [facility]   4/22/2020 11:30 AM Jeaneth Halo [facility]   4/24/2020 12:30 PM Bigg Benson [facility] AT [facility]

## 2020-03-23 NOTE — PATIENT INSTRUCTIONS
You may now shower and get your incisions wet. We recommend starting scar massage in 1 week to your incision(s). Take Vitamin E, Cocoa Butter or Scar Cream and massage the incision 2 times a day. This will help soften your incisions and help de-sensitize the skin as the nerves \"wake up\". You may remove your sling in 4 weeks. You may then begin to move your arm on your own in 4 weeks. Continue with no lifting, pushing or pulling until you are seen again in 6 weeks Remember nothing causes an increase in pain including physical therapy.

## 2020-03-23 NOTE — PROGRESS NOTES
Maury Evans  1980     HISTORY OF PRESENT ILLNESS  Maury Evans is a 46 y.o. female who presents today for evaluation s/p Left shoulder arthroscopic rotator cuff repair on 3/16/2020. Patient has been going to PT. Describes pain as a 3/10. Has been taking nothing for pain. Still has night pain. Patient denies any fever, chills, chest pain, shortness of breath or calf pain. The remainder of the review of systems is negative. There are no new illness or injuries to report since last seen in the office. No changes in medications, allergies, social or family history. PHYSICAL EXAM:   Visit Vitals  BP 91/75 (BP 1 Location: Left arm)   Pulse 67   Temp 97.7 °F (36.5 °C) (Oral)   Resp 19   Ht 5' 6.5\" (1.689 m)   Wt 178 lb (80.7 kg)   SpO2 99%   BMI 28.30 kg/m²      The patient is a well-developed, well-nourished female in no acute distress. The patient is alert and oriented times three. The patient appears to be well groomed. Mood and affect are normal.  ORTHOPEDIC EXAM of left shoulder:  Inspection: swelling not present,  Bruising not present  Incision, clean, dry, intact, sutures in place  Passive glenohumeral abduction 0-40 degrees  Stability: Stable  Strength: n/a  2+ distal pulses    IMPRESSION:  S/P Left shoulder arthroscopic rotator cuff repair    PLAN:   Incisions cleaned. Surgery was discussed at length today. Patient to continue with PROM and PT. Continue wearing sling. Stressed to patient that nothing causes an increase in pain. Will start active assist in 3 weeks. arom in 4 weeks.  D/c sling in 4 weeks  RTC 6 weeks    BARRY Morgan [facility]

## 2020-03-25 ENCOUNTER — HOSPITAL ENCOUNTER (OUTPATIENT)
Dept: PHYSICAL THERAPY | Age: 40
Discharge: HOME | End: 2020-03-25
Payer: COMMERCIAL

## 2020-03-25 PROCEDURE — 97140 MANUAL THERAPY 1/> REGIONS: CPT

## 2020-03-25 NOTE — PROGRESS NOTES
PHYSICAL THERAPY - DAILY TREATMENT NOTE    Patient Name: Felecia Gupta        Date: 3/25/2020  : 1980    Patient  Verified: YES  Visit #:   5   of   12  Insurance: Payor: [address] [place] [address] Road / Plan: [address] / Product Type: Managed Care Medicaid /      In time: 10:30 Out time: 11:05   Total Treatment Time: 35     Medicare Time Tracking (below)   Total Timed Codes (min):  - 1:1 Treatment Time:  -     TREATMENT AREA/ DIAGNOSIS = Left shoulder pain [M25.512]    SUBJECTIVE  Pain Level (on 0 to 10 scale):  0  / 10   Medication Changes/New allergies or changes in medical history, any new surgeries or procedures? NO    If yes, update Summary List   Subjective Functional Status/Changes:  []  No changes reported     Pt reports that she is wearing the sling at all times.       OBJECTIVE  Modalities Rationale:     decrease inflammation and decrease pain to improve patient's ability to perform ADLs without pain     min [] Estim, type/location:                                      []  att     []  unatt     []  w/US     []  w/ice    []  w/heat    min []  Mechanical Traction: type/lbs                   []  pro   []  sup   []  int   []  cont    []  before manual    []  after manual    min []  Ultrasound, settings/location:      min []  Iontophoresis w/ dexamethasone, location:                                               []  take home patch       []  in clinic   10 min [x]  Ice     []  Heat    location/position: L shoulder    min []  Vasopneumatic Device, press/temp:     min []  Other:    [] Skin assessment post-treatment (if applicable):    []  intact    []  redness- no adverse reaction     []redness  adverse reaction:        25 min Manual Therapy: PROM to L shoulder   Rationale:      increase ROM and increase tissue extensibility to improve patient's ability to perform ADLs without pain    Billed With/As:   [x] TE   [] TA   [] Neuro   [] Self Care Patient Education: [x] Review HEP [] Progressed/Changed HEP based on:   [] positioning   [] body mechanics   [] transfers   [] heat/ice application    [] other:        Other Objective/Functional Measures:    Pt had no increase in pain. PROM is [facility]   Post Treatment Pain Level (on 0 to 10) scale:   0  / 10     ASSESSMENT  Assessment/Changes in Function:     Pt had little guarding with PROM     []  See Progress Note/Recertification   Patient will continue to benefit from skilled PT services to address functional mobility deficits, address ROM deficits and address strength deficits to attain remaining goals.    Progress toward goals / Updated goals:    Good Progress to    [] STG    [x] LTG  1 as shown by improved mobility needed for ADLs     PLAN  []  Upgrade activities as tolerated YES Continue plan of care   []  Discharge due to :    []  Other:      Therapist: Mc Montgomery DPT     Date: 3/25/2020 Time: 11:06 AM        Future Appointments   Date Time Provider Marsha Bill   3/27/2020  8:30 AM Ami Pina [facility]   3/30/2020  9:00 AM Ami Pina [facility]   4/1/2020 11:30 AM Ami Pina [facility]   4/3/2020 12:30 PM Ami Pina [facility]   4/6/2020 10:30 AM Ami Pina [facility]   4/8/2020 12:00 PM Ami Pina [facility]   4/10/2020  1:00 PM Ami Pina [facility]   4/13/2020 12:00 PM Ami Pina [facility]   4/15/2020 11:30 AM Ami Pina [facility]   4/17/2020  9:00 AM Gia Pelletier PT [facility]   4/20/2020 11:00 AM Ami Pina [facility]   4/22/2020 11:30 AM Ami Pina [facility]   4/24/2020 12:30 PM Jitendra Celeste Necessary [facility] AT [facility]

## 2020-03-27 ENCOUNTER — HOSPITAL ENCOUNTER (OUTPATIENT)
Dept: PHYSICAL THERAPY | Age: 40
Discharge: HOME | End: 2020-03-27
Payer: COMMERCIAL

## 2020-03-27 PROCEDURE — 97140 MANUAL THERAPY 1/> REGIONS: CPT

## 2020-03-27 NOTE — PROGRESS NOTES
PHYSICAL THERAPY - DAILY TREATMENT NOTE    Patient Name: Ana Scott        Date: 3/27/2020  : 1980    Patient  Verified: YES  Visit #:   6   of   12  [facility]: [facility] [address] [address] / Plan: [address] / Product Type: Managed Care Medicaid /      In time: 8:30 Out time: 9:05   Total Treatment Time: 35     Medicare Time Tracking (below)   Total Timed Codes (min):  - 1:1 Treatment Time:  -     TREATMENT AREA/ DIAGNOSIS = Left shoulder pain [M25.512]    SUBJECTIVE  Pain Level (on 0 to 10 scale):  0  / 10   Medication Changes/New allergies or changes in medical history, any new surgeries or procedures? NO    If yes, update Summary List   Subjective Functional Status/Changes:  []  No changes reported     Pt reports no pain and is using the sling at all times.   Difficulty with all ADLs that require the LUE      OBJECTIVE  Modalities Rationale:     decrease inflammation and decrease pain to improve patient's ability to perform ADLs without pain   min [] Estim, type/location:                                      []  att     []  unatt     []  w/US     []  w/ice    []  w/heat    min []  Mechanical Traction: type/lbs                   []  pro   []  sup   []  int   []  cont    []  before manual    []  after manual    min []  Ultrasound, settings/location:      min []  Iontophoresis w/ dexamethasone, location:                                               []  take home patch       []  in clinic   10 min [x]  Ice     []  Heat    location/position: L shoulder    min []  Vasopneumatic Device, press/temp:     min []  Other:    [] Skin assessment post-treatment (if applicable):    []  intact    []  redness- no adverse reaction     []redness  adverse reaction:        25 min Manual Therapy: PROM to L shoulder   Rationale:      increase ROM and increase tissue extensibility to improve patient's ability to perform ADLs without pain    Billed With/As:   [x] TE   [] TA   [] Neuro   [] Self Care Patient Education: [x] Review HEP    [] Progressed/Changed HEP based on:   [] positioning   [] body mechanics   [] transfers   [] heat/ice application    [] other:        Other Objective/Functional Measures:    PROM WFL   Post Treatment Pain Level (on 0 to 10) scale:   0  / 10     ASSESSMENT  Assessment/Changes in Function:     Pt had little guarding with PROM     []  See Progress Note/Recertification   Patient will continue to benefit from skilled PT services to modify and progress therapeutic interventions, address functional mobility deficits, address ROM deficits and address strength deficits to attain remaining goals.    Progress toward goals / Updated goals:    Good Progress to    [] STG    [x] LTG  1 as shown by improved mobility needed for ADLs     PLAN  [x]  Upgrade activities as tolerated YES Continue plan of care   []  Discharge due to :    []  Other:      Therapist: Allison Chawla DPT     Date: 3/27/2020 Time: 9:17 AM        Future Appointments   Date Time Provider Marsha Bill   3/30/2020  9:00 AM Jermaine Mandeep [facility]   4/1/2020 11:30 AM Jermaine Mandeep [facility]   4/3/2020 12:30 PM Jermaine Mandeep [facility]   4/6/2020 10:30 AM Jermaine Mandeep [facility]   4/8/2020 12:00 PM Jermaine Mandeep [facility]   4/10/2020  1:00 PM Jermaine Mandeep [facility]   4/13/2020 12:00 PM Jermaine Mandeep [facility]   4/15/2020 11:30 AM Jermaine Mandeep [facility]   4/17/2020  9:00 AM Delores Kaufman, PT [facility]   4/20/2020 11:00 AM Jermaine Mandeep [facility]   4/22/2020 11:30 AM Jermaine Mandeep [facility]   4/24/2020 12:30 PM Jermaine Mandeep [facility]

## 2020-03-30 ENCOUNTER — HOSPITAL ENCOUNTER (OUTPATIENT)
Dept: PHYSICAL THERAPY | Age: 40
Discharge: HOME | End: 2020-03-30
Payer: COMMERCIAL

## 2020-03-30 PROCEDURE — 97140 MANUAL THERAPY 1/> REGIONS: CPT

## 2020-03-30 NOTE — PROGRESS NOTES
PHYSICAL THERAPY - DAILY TREATMENT NOTE    Patient Name: Herbie Leigh        Date: 3/30/2020  : 1980    Patient  Verified: YES  Visit #:   7   of   16  Insurance: Payor: [address] [place] [address] Road / Plan: [address] / Product Type: Managed Care Medicaid /      In time: 9:00 Out time: 9:35   Total Treatment Time: 35     Medicare Time Tracking (below)   Total Timed Codes (min):  - 1:1 Treatment Time:  -     TREATMENT AREA/ DIAGNOSIS = Left shoulder pain [M25.512]    SUBJECTIVE  Pain Level (on 0 to 10 scale):  0  / 10   Medication Changes/New allergies or changes in medical history, any new surgeries or procedures?     NO    If yes, update Smmary List   Subjective Functional Status/Changes:  []  No changes reported     See PN      OBJECTIVE  Modalities Rationale:     decrease inflammation and decrease pain to improve patient's ability to perform ADLs without pain     min [] Estim, type/location:                                      []  att     []  unatt     []  w/US     []  w/ice    []  w/heat    min []  Mechanical Traction: type/lbs                   []  pro   []  sup   []  int   []  cont    []  before manual    []  after manual    min []  Ultrasound, settings/location:      min []  Iontophoresis w/ dexamethasone, location:                                               []  take home patch       []  in clinic   10 min [x]  Ice     []  Heat    location/position: L shoulder    min []  Vasopneumatic Device, press/temp:     min []  Other:    [] Skin assessment post-treatment (if applicable):    []  intact    []  redness- no adverse reaction     []redness  adverse reaction:          25 min Manual Therapy: PROM to L shoulder   Rationale:      increase ROM and increase tissue extensibility to improve patient's ability to perform ADLs without pain    Billed With/As:   [x] TE   [] TA   [] Neuro   [] Self Care Patient Education: [x] Review HEP    [] Progressed/Changed HEP based on:   [] positioning   [] body mechanics   [] transfers   [] heat/ice application    [] other:        Other Objective/Functional Measures:    See PN   Post Treatment Pain Level (on 0 to 10) scale:   0  / 10     ASSESSMENT  Assessment/Changes in Function:     See PN     []  See Progress Note/Recertification   Patient will continue to benefit from skilled PT services to modify and progress therapeutic interventions, address functional mobility deficits, address ROM deficits and address strength deficits to attain remaining goals.    Progress toward goals / Updated goals:    See PN     PLAN  [x]  Upgrade activities as tolerated YES Continue plan of care   []  Discharge due to :    []  Other:      Therapist: Vipul Hurt DPT     Date: 3/30/2020 Time: 7:50 AM        Future Appointments   Date Time Provider Marsha Bill   3/30/2020  9:00 AM Genetta Sprung [facility]   4/1/2020 11:30 AM Genetta Sprung [facility]   4/3/2020 12:30 PM Genetta Sprung [facility]   4/6/2020 10:30 AM Genetta Sprung [facility]   4/8/2020 12:00 PM Genetta Sprung [facility]   4/10/2020  1:00 PM Genetta Sprung [facility]   4/13/2020 12:00 PM Genetta Sprung [facility]   4/15/2020 11:30 AM Genetta Sprung [facility]   4/17/2020  9:00 AM Tammy Pak PT [facility]   4/20/2020 11:00 AM Genetta Sprung [facility]   4/22/2020 11:30 AM Genetta Sprung [facility]   4/24/2020 12:30 PM Genetta Sprung [facility]

## 2020-03-30 NOTE — PROGRESS NOTES
[facility] PHYSICAL THERAPY AT [facility] [address]  Phone: [phone]  Fax: [phone]  PROGRESS NOTE  Patient Name: Claus Rai : 1980   Treatment/Medical Diagnosis: Left shoulder pain [M25.512]   Referral Source: Junior Anguiano,*     Date of Initial Visit: 3/17/2020 Attended Visits: 7 Missed Visits: 0     SUMMARY OF TREATMENT  PT consisted of manual therapy techniques, therapeutic exercises, and modalities to improve strength, improve mobility, decrease pain, and improve overall function. CURRENT STATUS  Pt is s/p L RCR 3/16/2020. Pt has been seen for a total of 7 visits since onset of care. Pt is still wearing the sling at all times and is still PROM only. PROM flexion: 145deg  PROM abd: 110deg  PROM ER: 50deg      Goal/Measure of Progress Goal Met? 1. Increase score on FOTO to > or = 60 to demo an increase in functional activity tolerance with the UE. Status at last Eval: 32 Current Status: 40 Progressing   2.   Pt will note < or = 2/10 pain with all mobility to improve comfort with ADLs   Status at last Eval: 0-3 Current Status: 0 yes   3. Pt to demonstrate a GROC score of >/= +5 to show overall improvement in function   Status at last Eval: NA Current Status: +7 yes     New Goals to be achieved in __8__  weeks:  1. Continue goal #1   2. Pt to have shoulder PROM WFL to help with ADLs   3.  -   4.  -       RECOMMENDATIONS  Pt to continue PT 2-3x a week for 6 weeks to continue through current protocol. If you have any questions/comments please contact us directly at [phone]. Thank you for allowing us to assist in the care of your patient. Therapist Signature: Kaden Frye Date: 3/30/2020     Time: 7:52 AM   NOTE TO PHYSICIAN:  PLEASE COMPLETE THE ORDERS BELOW AND FAX TO   [facility] at [email or web address]: [phone].   If you are unable to process this request in 24 hours please contact our office: [phone].    ___ I have read the above report and request that my patient continue as recommended.   ___ I have read the above report and request that my patient continue therapy with the following changes/special instructions:_________________________________________________________   ___ I have read the above report and request that my patient be discharged from therapy.      Physician Signature:        Date:       Time:

## 2020-04-01 ENCOUNTER — HOSPITAL ENCOUNTER (OUTPATIENT)
Dept: PHYSICAL THERAPY | Age: 40
Discharge: HOME | End: 2020-04-01
Payer: MEDICAID

## 2020-04-01 PROCEDURE — 97140 MANUAL THERAPY 1/> REGIONS: CPT

## 2020-04-01 NOTE — PROGRESS NOTES
PHYSICAL THERAPY - DAILY TREATMENT NOTE    Patient Name: Shreyas Rojas        Date: 2020  : 1980    Patient  Verified: YES  Visit #:   8   of   24  [facility]: [facility] [address] / Plan: [address] / Product Type: Managed Care Medicaid /      In time: 11:30 Out time: 12:05   Total Treatment Time: 35     Medicare Time Tracking (below)   Total Timed Codes (min):  - 1:1 Treatment Time:  -     TREATMENT AREA/ DIAGNOSIS = Left shoulder pain [M25.512]    SUBJECTIVE  Pain Level (on 0 to 10 scale):  0  / 10   Medication Changes/New allergies or changes in medical history, any new surgeries or procedures? NO    If yes, update Summary List   Subjective Functional Status/Changes:  []  No changes reported     Pt reports staying in sling.  Still having toruble with all ADLs      OBJECTIVE  Modalities Rationale:     decrease inflammation and decrease pain to improve patient's ability to perform ADLs without pain     min [] Estim, type/location:                                      []  att     []  unatt     []  w/US     []  w/ice    []  w/heat    min []  Mechanical Traction: type/lbs                   []  pro   []  sup   []  int   []  cont    []  before manual    []  after manual    min []  Ultrasound, settings/location:      min []  Iontophoresis w/ dexamethasone, location:                                               []  take home patch       []  in clinic   10 min [x]  Ice     []  Heat    location/position: L shoulder    min []  Vasopneumatic Device, press/temp:     min []  Other:    [] Skin assessment post-treatment (if applicable):    []  intact    []  redness- no adverse reaction     []redness - adverse reaction:      25 min Manual Therapy: PROM to L shoulder   Rationale:      increase ROM and increase tissue extensibility to improve patient's ability to perform ADLs without pain    Billed With/As:   [x] TE   [] TA   [] Neuro   [] Self Care Patient Education: [x] Review HEP    [] Progressed/Changed HEP based on:   [] positioning   [] body mechanics   [] transfers   [] heat/ice application    [] other:        Other Objective/Functional Measures:    PROM WFL   Post Treatment Pain Level (on 0 to 10) scale:   0  / 10     ASSESSMENT  Assessment/Changes in Function:     Pt shwoing good mobility      []  See Progress Note/Recertification   Patient will continue to benefit from skilled PT services to modify and progress therapeutic interventions, address functional mobility deficits, address ROM deficits, address strength deficits and analyze and address soft tissue restrictions to attain remaining goals.    Progress toward goals / Updated goals:    Good Progress to    [] STG    [x] LTG  1 as shown by good mobility needed for ADLs     PLAN  [x]  Upgrade activities as tolerated YES Continue plan of care   []  Discharge due to :    []  Other:      Therapist: Concepción Wagner DPT     Date: 4/1/2020 Time: 10:35 AM        Future Appointments   Date Time Provider Marsha Bill   4/1/2020 11:30 AM Blayne Danube [facility]   4/3/2020 12:30 PM Blayne Danube [facility]   4/6/2020 10:30 AM Blayne Danube [facility]   4/8/2020 12:00 PM Blayne Danube [facility]   4/10/2020  1:00 PM Blayne Danube [facility]   4/13/2020 12:00 PM Blayne Danube [facility]   4/15/2020 11:30 AM Blayne Danube [facility]   4/17/2020  9:00 AM Zia Winchester PT [facility]   4/20/2020 11:00 AM Blayne Danube [facility]   4/22/2020 11:30 AM Blayne Danube [facility]   4/24/2020 12:30 PM Blayne Danube [facility]

## 2020-04-03 ENCOUNTER — HOSPITAL ENCOUNTER (OUTPATIENT)
Dept: PHYSICAL THERAPY | Age: 40
Discharge: HOME | End: 2020-04-03
Payer: MEDICAID

## 2020-04-03 PROCEDURE — 97140 MANUAL THERAPY 1/> REGIONS: CPT

## 2020-04-03 NOTE — PROGRESS NOTES
PHYSICAL THERAPY - DAILY TREATMENT NOTE    Patient Name: Rayshawn Wong        Date: 4/3/2020  : 1980    Patient  Verified: YES  Visit #:     Insurance: Payor: Jayna Aldrich / Plan: [address] / Product Type: HMO /      In time: 12:30 Out time: 1:10   Total Treatment Time: 40     Medicare Time Tracking (below)   Total Timed Codes (min):  - 1:1 Treatment Time:  -     TREATMENT AREA/ DIAGNOSIS = Left shoulder pain [M25.512]    SUBJECTIVE  Pain Level (on 0 to 10 scale):  0  / 10   Medication Changes/New allergies or changes in medical history, any new surgeries or procedures?     NO    If yes, update Summary List   Subjective Functional Status/Changes:  []  No changes reported     Pt reports no pain and still in the sling at all times      OBJECTIVE  Modalities Rationale:     decrease inflammation and decrease pain to improve patient's ability to perform ADLs without pain     min [] Estim, type/location:                                      []  att     []  unatt     []  w/US     []  w/ice    []  w/heat    min []  Mechanical Traction: type/lbs                   []  pro   []  sup   []  int   []  cont    []  before manual    []  after manual    min []  Ultrasound, settings/location:      min []  Iontophoresis w/ dexamethasone, location:                                               []  take home patch       []  in clinic    min []  Ice     []  Heat    location/position:     min []  Vasopneumatic Device, press/temp:     min []  Other:    [] Skin assessment post-treatment (if applicable):    []  intact    []  redness- no adverse reaction     []redness - adverse reaction:          30 min Manual Therapy: PROM to L shoulder   Rationale:      increase ROM and increase tissue extensibility to improve patient's ability to perform ADLs without pain    Other Objective/Functional Measures:    PROM WFL   Post Treatment Pain Level (on 0 to 10) scale:   0  / 10     ASSESSMENT  Assessment/Changes in Function: Little to no guarding during PROM     []  See Progress Note/Recertification   Patient will continue to benefit from skilled PT services to modify and progress therapeutic interventions, address functional mobility deficits, address ROM deficits and address strength deficits to attain remaining goals.    Progress toward goals / Updated goals:    Good Progress to    [] STG    [x] LTG  1 as shown by improved mobility needed for ADLs     PLAN  [x]  Upgrade activities as tolerated YES Continue plan of care   []  Discharge due to :    []  Other:      Therapist: Nicolas Tobar DPT     Date: 4/3/2020 Time: 11:21 AM        Future Appointments   Date Time Provider Marsha Bill   4/3/2020 12:30 PM Theola Cools [facility]   4/3/2020  2:30 PM Tulio Machado MD [facility]   4/6/2020 10:30 AM Theola Cools [facility]   4/8/2020 12:00 PM Theola Cools [facility]   4/10/2020  1:00 PM Theola Cools [facility]   4/13/2020 12:00 PM Theola Cools [facility]   4/15/2020 11:30 AM Theola Cools [facility]   4/17/2020  9:00 AM Todd Sauceda PT [facility]   4/20/2020 11:00 AM Theola Cools [facility]   4/22/2020 11:30 AM Theola Cools [facility]   4/24/2020 12:30 PM Theola Cools [facility]   5/5/2020  9:30 AM Robina Shen PA [address]

## 2020-04-06 ENCOUNTER — HOSPITAL ENCOUNTER (OUTPATIENT)
Dept: PHYSICAL THERAPY | Age: 40
Discharge: HOME | End: 2020-04-06
Payer: MEDICAID

## 2020-04-06 PROCEDURE — 97140 MANUAL THERAPY 1/> REGIONS: CPT

## 2020-04-06 NOTE — PROGRESS NOTES
PHYSICAL THERAPY - DAILY TREATMENT NOTE    Patient Name: Breanna Hull        Date: 2020  : 1980    Patient  Verified: YES  Visit #:   10   of   16  Insurance: Payor: [employer]ICA[employer] / Plan: [facility] [employer] / Product Type: Managed Care Medicaid /      In time: 10:00 Out time: 10:35   Total Treatment Time: 35     Medicare Time Tracking (below)   Total Timed Codes (min):  - 1:1 Treatment Time:  -     TREATMENT AREA/ DIAGNOSIS = Left shoulder pain [M25.512]    SUBJECTIVE  Pain Level (on 0 to 10 scale):  0  / 10   Medication Changes/New allergies or changes in medical history, any new surgeries or procedures? NO    If yes, update Summary List   Subjective Functional Status/Changes:  []  No changes reported     Pt reports that she is wearing her sling at all times.        OBJECTIVE  Modalities Rationale:     decrease inflammation to improve patient's ability to perform ADLs without pain     min [] Estim, type/location:                                      []  att     []  unatt     []  w/US     []  w/ice    []  w/heat    min []  Mechanical Traction: type/lbs                   []  pro   []  sup   []  int   []  cont    []  before manual    []  after manual    min []  Ultrasound, settings/location:      min []  Iontophoresis w/ dexamethasone, location:                                               []  take home patch       []  in clinic   10 min [x]  Ice     []  Heat    location/position: L shoulder    min []  Vasopneumatic Device, press/temp:     min []  Other:    [] Skin assessment post-treatment (if applicable):    []  intact    []  redness- no adverse reaction     []redness - adverse reaction:        25 min Manual Therapy: PROM to L shoulder   Rationale:      increase ROM to improve patient's ability to .pjds    Billed With/As:   [x] TE   [] TA   [] Neuro   [] Self Care Patient Education: [x] Review HEP    [] Progressed/Changed HEP based on:   [] positioning   [] body mechanics [] transfers   [] heat/ice application    [] other:        Other Objective/Functional Measures:    PROM WFL   Post Treatment Pain Level (on 0 to 10) scale:   0  / 10     ASSESSMENT  Assessment/Changes in Function:     Pt showing little guarding with PROM     []  See Progress Note/Recertification   Patient will continue to benefit from skilled PT services to modify and progress therapeutic interventions, address functional mobility deficits and address ROM deficits to attain remaining goals.    Progress toward goals / Updated goals:    Good Progress to    [] STG    [x] LTG  1 as shown by improved mobility needed for ADLs     PLAN  [x]  Upgrade activities as tolerated YES Continue plan of care   []  Discharge due to :    []  Other:      Therapist: Laine Berumen DPT     Date: 4/6/2020 Time: 10:34 AM        Future Appointments   Date Time Provider Marsha Bill   4/8/2020 12:30 PM Sebastian Senate [facility]   4/10/2020  1:00 PM [facility]   4/13/2020 12:00 PM [facility]   4/15/2020 11:30 AM Sebastian [facility]   4/17/2020  9:00 AM Siobhan Ledbetter PT [facility] AT [facility]   4/20/2020 11:00 AM [facility]   4/22/2020 11:30 AM Sebastian Senate [facility]   4/24/2020 12:30 PM Sebastian Senate [facility]   5/5/2020  9:30 AM MARK [address] 70

## 2020-04-08 ENCOUNTER — HOSPITAL ENCOUNTER (OUTPATIENT)
Dept: PHYSICAL THERAPY | Age: 40
Discharge: HOME | End: 2020-04-08
Payer: MEDICAID

## 2020-04-08 PROCEDURE — 97140 MANUAL THERAPY 1/> REGIONS: CPT

## 2020-04-08 NOTE — PROGRESS NOTES
PHYSICAL THERAPY - DAILY TREATMENT NOTE    Patient Name: Davy Dowell        Date: 2020  : 1980    Patient  Verified: YES  Visit #:     Insurance: Payor: [employer] MEDICAID / Plan: [facility] [employer] / Product Type: Managed Care Medicaid /      In time: 12:30 Out time: 1:05   Total Treatment Time: 35     Medicare Time Tracking (below)   Total Timed Codes (min):  - 1:1 Treatment Time:  -     TREATMENT AREA/ DIAGNOSIS = Left shoulder pain [M25.512]    SUBJECTIVE  Pain Level (on 0 to 10 scale):  0  / 10   Medication Changes/New allergies or changes in medical history, any new surgeries or procedures? NO    If yes, update Summary List   Subjective Functional Status/Changes:  []  No changes reported     Pt reports that her arm feels stiff.        OBJECTIVE  Modalities Rationale:     decrease inflammation and decrease pain to improve patient's ability to perform ADLs without pain     min [] Estim, type/location:                                      []  att     []  unatt     []  w/US     []  w/ice    []  w/heat    min []  Mechanical Traction: type/lbs                   []  pro   []  sup   []  int   []  cont    []  before manual    []  after manual    min []  Ultrasound, settings/location:      min []  Iontophoresis w/ dexamethasone, location:                                               []  take home patch       []  in clinic   10 min [x]  Ice     []  Heat    location/position: r shoulder    min []  Vasopneumatic Device, press/temp:     min []  Other:    [] Skin assessment post-treatment (if applicable):    []  intact    []  redness- no adverse reaction     []redness - adverse reaction:        25 min Manual Therapy: PROM to R shoulder   Rationale:      increase ROM and increase tissue extensibility to improve patient's ability to perform ADLs without pain    Billed With/As:   [x] TE   [] TA   [] Neuro   [] Self Care Patient Education: [x] Review HEP    [] Progressed/Changed HEP based on:   [] positioning   [] body mechanics   [] transfers   [] heat/ice application    [] other:        Other Objective/Functional Measures:    PROM WFL   Post Treatment Pain Level (on 0 to 10) scale:   0  / 10     ASSESSMENT  Assessment/Changes in Function:     Pt showing little to no guarding. Still PROM only     []  See Progress Note/Recertification   Patient will continue to benefit from skilled PT services to modify and progress therapeutic interventions, address functional mobility deficits, address ROM deficits and address strength deficits to attain remaining goals.    Progress toward goals / Updated goals:    Good Progress to    [] STG    [x] LTG  1 as shown by good mobility needed for ADLs     PLAN  []  Upgrade activities as tolerated YES Continue plan of care   []  Discharge due to :    []  Other:      Therapist: Brandie Garrido DPT     Date: 4/8/2020 Time: 2:20 PM        Future Appointments   Date Time Provider Marsha Bill   4/10/2020  1:00 PM [facility]   4/13/2020 12:00 PM [facility]   4/15/2020 11:30 AM [facility]   4/17/2020  9:00 AM Brandie Morelos PT [facility]   4/20/2020 11:00 AM [facility]   4/22/2020 11:30 AM [facility]   4/24/2020 12:30 PM [facility]   5/5/2020  9:30 AM Audrey Leung PA [address]

## 2020-04-10 ENCOUNTER — HOSPITAL ENCOUNTER (OUTPATIENT)
Dept: PHYSICAL THERAPY | Age: 40
Discharge: HOME | End: 2020-04-10
Payer: MEDICAID

## 2020-04-10 PROCEDURE — 97140 MANUAL THERAPY 1/> REGIONS: CPT

## 2020-04-10 NOTE — PROGRESS NOTES
PHYSICAL THERAPY - DAILY TREATMENT NOTE    Patient Name: Yolande Parmar        Date: 4/10/2020  : 1980    Patient  Verified: YES  Visit #:     Insurance: Payor: Paddy Ron / Plan: [id number] [place] [address] / Product Type: Managed Care Medicaid /      In time: 1:05 Out time: 1:40   Total Treatment Time: 35     Medicare Time Tracking (below)   Total Timed Codes (min):  - 1:1 Treatment Time:  -     TREATMENT AREA/ DIAGNOSIS = Left shoulder pain [M25.512]    SUBJECTIVE  Pain Level (on 0 to 10 scale):  0-1  / 10   Medication Changes/New allergies or changes in medical history, any new surgeries or procedures? NO    If yes, update Summary List   Subjective Functional Status/Changes:  []  No changes reported     Pt still in the sling at all times.        OBJECTIVE  Modalities Rationale:     decrease inflammation and decrease pain to improve patient's ability to perform ADLs without pain     min [] Estim, type/location:                                      []  att     []  unatt     []  w/US     []  w/ice    []  w/heat    min []  Mechanical Traction: type/lbs                   []  pro   []  sup   []  int   []  cont    []  before manual    []  after manual    min []  Ultrasound, settings/location:      min []  Iontophoresis w/ dexamethasone, location:                                               []  take home patch       []  in clinic   10 min [x]  Ice     []  Heat    location/position: L shoulder    min []  Vasopneumatic Device, press/temp:     min []  Other:    [] Skin assessment post-treatment (if applicable):    []  intact    []  redness- no adverse reaction     []redness - adverse reaction:        25 min Manual Therapy: PROM to L shoulder   Rationale:      increase ROM and increase tissue extensibility to improve patient's ability to perform ADLs without pain    Billed With/As:   [x] TE   [] TA   [] Neuro   [] Self Care Patient Education: [x] Review HEP    [] Progressed/Changed HEP based on:   [] positioning   [] body mechanics   [] transfers   [] heat/ice application    [] other:        Other Objective/Functional Measures:    PROM flexion: 165   Post Treatment Pain Level (on 0 to 10) scale:   0  / 10     ASSESSMENT  Assessment/Changes in Function:     Pt showing little guarding. Still limited with all ADLs due to immobility     []  See Progress Note/Recertification   Patient will continue to benefit from skilled PT services to modify and progress therapeutic interventions, address functional mobility deficits, address ROM deficits, address strength deficits and analyze and address soft tissue restrictions to attain remaining goals.    Progress toward goals / Updated goals:    Good Progress to    [] STG    [x] LTG  1 as shown by good mobility needed for ADLs     PLAN  [x]  Upgrade activities as tolerated YES Continue plan of care   []  Discharge due to :    []  Other:      Therapist: John Bajwa DPT     Date: 4/10/2020 Time: 1:00 PM        Future Appointments   Date Time Provider Marsha Bill   4/13/2020 12:00 PM Sonia Reno, PT [facility] AT [facility]   4/15/2020 11:30 AM Perla Sotomayor [facility] AT [facility]   4/17/2020  9:00 AM Sonia Reno PT [facility]   4/20/2020 11:00 AM Perla Sotomayor [facility]   4/22/2020 11:30 AM Perla Sotomayor [facility] AT [facility]   4/24/2020 12:30 PM Perla Sotomayor [facility] AT [facility]   5/5/2020  9:30 AM Mima Ocasio PA [address]

## 2020-04-13 ENCOUNTER — HOSPITAL ENCOUNTER (OUTPATIENT)
Dept: PHYSICAL THERAPY | Age: 40
Discharge: HOME | End: 2020-04-13
Payer: MEDICAID

## 2020-04-13 PROCEDURE — 97140 MANUAL THERAPY 1/> REGIONS: CPT

## 2020-04-13 NOTE — PROGRESS NOTES
PHYSICAL THERAPY - DAILY TREATMENT NOTE    Patient Name: Claus Rai        Date: 2020  : 1980    Patient  Verified: YES  Visit #:   15   of   24  Insurance: Payor: Marianna Nurse / Plan: [facility] HEALTHKEEPERS PLUS / Product Type: Managed Care Medicaid /      In time: 12:00 Out time: 12:35   Total Treatment Time: 35     Medicare Time Tracking (below)   Total Timed Codes (min):  - 1:1 Treatment Time:  -     TREATMENT AREA/ DIAGNOSIS = Left shoulder pain [M25.512]    SUBJECTIVE  Pain Level (on 0 to 10 scale):  0  / 10   Medication Changes/New allergies or changes in medical history, any new surgeries or procedures? NO    If yes, update Summary List   Subjective Functional Status/Changes:  []  No changes reported     Pt is still in the sling at all times. Pt able to begin [facility] next Monday.       OBJECTIVE  Modalities Rationale:     decrease inflammation and decrease pain to improve patient's ability to perform ADLs without pain     min [] Estim, type/location:                                      []  att     []  unatt     []  w/US     []  w/ice    []  w/heat    min []  Mechanical Traction: type/lbs                   []  pro   []  sup   []  int   []  cont    []  before manual    []  after manual    min []  Ultrasound, settings/location:      min []  Iontophoresis w/ dexamethasone, location:                                               []  take home patch       []  in clinic   10 min [x]  Ice     []  Heat    location/position: L shoulder    min []  Vasopneumatic Device, press/temp:     min []  Other:    [] Skin assessment post-treatment (if applicable):    []  intact    []  redness- no adverse reaction     []redness - adverse reaction:        25 min Manual Therapy: PROM to L shoulder   Rationale:      increase ROM and increase tissue extensibility to improve patient's ability to perform ADLs without pain    Billed With/As:   [] TE   [] TA   [] Neuro   [] Self Care Patient Education: [x] Review HEP    [] Progressed/Changed HEP based on:   [] positioning   [] body mechanics   [] transfers   [] heat/ice application    [] other:        Other Objective/Functional Measures:    PROM WFL \   Post Treatment Pain Level (on 0 to 10) scale:   0  / 10     ASSESSMENT  Assessment/Changes in Function:     Pt had little to no guarding  Able to progress protocol in 1 week     []  See Progress Note/Recertification   Patient will continue to benefit from skilled PT services to modify and progress therapeutic interventions, address functional mobility deficits, address ROM deficits, address strength deficits and analyze and address soft tissue restrictions to attain remaining goals.    Progress toward goals / Updated goals:    Good Progress to    [] STG    [x] LTG  1 as shown by good mobility needed for ADLs     PLAN  [x]  Upgrade activities as tolerated YES Continue plan of care   []  Discharge due to :    []  Other:      Therapist: Morgan Tadeo DPT     Date: 4/13/2020 Time: 12:34 PM        Future Appointments   Date Time Provider Marsha Bill   4/15/2020 11:30 AM Jonny Mott [facility]   4/17/2020  9:00 AM Constanza Hair PT [facility] AT [facility]   4/20/2020  3:00 PM [place] [facility]   4/22/2020 11:30 AM [place]   4/24/2020 12:30 PM Jonny Baliam [facility] AT [facility]   5/5/2020  9:30 AM Radha Rob PA [address]

## 2020-04-15 ENCOUNTER — HOSPITAL ENCOUNTER (OUTPATIENT)
Dept: PHYSICAL THERAPY | Age: 40
Discharge: HOME | End: 2020-04-15
Payer: MEDICAID

## 2020-04-15 PROCEDURE — 97140 MANUAL THERAPY 1/> REGIONS: CPT

## 2020-04-15 NOTE — PROGRESS NOTES
PHYSICAL THERAPY - DAILY TREATMENT NOTE    Patient Name: Claus Rai        Date: 4/15/2020  : 1980    Patient  Verified: YES  Visit #:   15   of   24  Insurance: Payor: Marianna Nurse / Plan: [facility] HEALTHKEEPERS PLUS / Product Type: Managed Care Medicaid /      In time: 11:30 Out time: 12:05   Total Treatment Time: 35     Medicare Time Tracking (below)   Total Timed Codes (min):  - 1:1 Treatment Time:  -     TREATMENT AREA/ DIAGNOSIS = Left shoulder pain [M25.512]    SUBJECTIVE  Pain Level (on 0 to 10 scale):  0  / 10   Medication Changes/New allergies or changes in medical history, any new surgeries or procedures?     NO    If yes, update Summary List   Subjective Functional Status/Changes:  []  No changes reported     Pt reports no pain and is using her sling at all times        OBJECTIVE  Modalities Rationale:     decrease inflammation and decrease pain to improve patient's ability to perform ADLs without pain     min [] Estim, type/location:                                      []  att     []  unatt     []  w/US     []  w/ice    []  w/heat    min []  Mechanical Traction: type/lbs                   []  pro   []  sup   []  int   []  cont    []  before manual    []  after manual    min []  Ultrasound, settings/location:      min []  Iontophoresis w/ dexamethasone, location:                                               []  take home patch       []  in clinic   10 min [x]  Ice     []  Heat    location/position: L shoulder    min []  Vasopneumatic Device, press/temp:     min []  Other:    [] Skin assessment post-treatment (if applicable):    []  intact    []  redness- no adverse reaction     []redness - adverse reaction:        25 min Manual Therapy: PROM to L shoulder   Rationale:      increase ROM and increase tissue extensibility to improve patient's ability to perform ADLs without pain      Billed With/As:   [x] TE   [] TA   [] Neuro   [] Self Care Patient Education: [x] Review HEP [] Progressed/Changed HEP based on:   [] positioning   [] body mechanics   [] transfers   [] heat/ice application    [] other:        Other Objective/Functional Measures:    PROM WFL   Post Treatment Pain Level (on 0 to 10) scale:   0  / 10     ASSESSMENT  Assessment/Changes in Function:     Pt showing little to no guarding     []  See Progress Note/Recertification   Patient will continue to benefit from skilled PT services to modify and progress therapeutic interventions, address functional mobility deficits, address ROM deficits and address strength deficits to attain remaining goals.    Progress toward goals / Updated goals:    Good Progress to    [] STG    [x] LTG  1 as shown by good mobility needed for ADLs     PLAN  [x]  Upgrade activities as tolerated YES Continue plan of care   []  Discharge due to :    []  Other:      Therapist: Bry Wisdom DPT     Date: 4/15/2020 Time: 12:07 PM        Future Appointments   Date Time Provider Marsha Bill   4/17/2020  9:00 AM John , PT [facility]   4/20/2020  3:00 PM [facility]   4/22/2020 11:30 AM [facility]   4/24/2020 12:30 PM [facility]   5/5/2020  9:30 AM Mckenzie Rae PA [address]

## 2020-04-17 ENCOUNTER — HOSPITAL ENCOUNTER (OUTPATIENT)
Dept: PHYSICAL THERAPY | Age: 40
Discharge: HOME | End: 2020-04-17
Payer: MEDICAID

## 2020-04-17 PROCEDURE — 97140 MANUAL THERAPY 1/> REGIONS: CPT

## 2020-04-17 NOTE — PROGRESS NOTES
PHYSICAL THERAPY - DAILY TREATMENT NOTE    Patient Name: Dorita Bidding        Date: 2020  : 1980    Patient  Verified: YES  Visit #:     Insurance: Payor: [employer] / Plan: [facility] [employer]ERS [employer] / Product Type: Managed Care Medicaid /      In time: 10:30 Out time: 11:05   Total Treatment Time: 35     Medicare Time Tracking (below)   Total Timed Codes (min):  - 1:1 Treatment Time:  -     TREATMENT AREA/ DIAGNOSIS = Left shoulder pain [M25.512]    SUBJECTIVE  Pain Level (on 0 to 10 scale):  0  / 10   Medication Changes/New allergies or changes in medical history, any new surgeries or procedures? NO    If yes, update Summary List   Subjective Functional Status/Changes:  []  No changes reported     Pt still in sling no using her arm.       OBJECTIVE  Modalities Rationale:     decrease inflammation and decrease pain to improve patient's ability to perform ADLs without pain     min [] Estim, type/location:                                      []  att     []  unatt     []  w/US     []  w/ice    []  w/heat    min []  Mechanical Traction: type/lbs                   []  pro   []  sup   []  int   []  cont    []  before manual    []  after manual    min []  Ultrasound, settings/location:      min []  Iontophoresis w/ dexamethasone, location:                                               []  take home patch       []  in clinic   10 min [x]  Ice     []  Heat    location/position: L shoulder    min []  Vasopneumatic Device, press/temp:     min []  Other:    [] Skin assessment post-treatment (if applicable):    []  intact    []  redness- no adverse reaction     []redness - adverse reaction:        25 min Manual Therapy: PROM to L shoulder   Rationale:      increase ROM and increase tissue extensibility to improve patient's ability to perform ADLs without pain    Billed With/As:   [x] TE   [] TA   [] Neuro   [] Self Care Patient Education: [x] Review HEP    [] Progressed/Changed HEP based on:   [] positioning   [] body mechanics   [] transfers   [] heat/ice application    [] other:        Other Objective/Functional Measures:    PROM WNL   Post Treatment Pain Level (on 0 to 10) scale:   0  / 10     ASSESSMENT  Assessment/Changes in Function:     Pt had little to no guarding during PROM. Pt able to progress to [facility] next visit per protocol     []  See Progress Note/Recertification   Patient will continue to benefit from skilled PT services to modify and progress therapeutic interventions, address functional mobility deficits, address ROM deficits, address strength deficits and analyze and address soft tissue restrictions to attain remaining goals.    Progress toward goals / Updated goals:    Good Progress to    [] STG    [x] LTG  1 as shown by good mobility needed for ADLs when allowed     PLAN  [x]  Upgrade activities as tolerated YES Continue plan of care   []  Discharge due to :    []  Other:      Therapist: Kaden Frye DPT     Date: 4/17/2020 Time: 8:56 AM        Future Appointments   Date Time Provider Marsha Bill   4/17/2020  9:00 AM [facility]   4/20/2020  3:00 PM [facility]   4/22/2020 11:30 AM [facility]   4/24/2020 12:30 PM [facility]   5/5/2020  9:30 AM MARK Wang [address]   6/11/2020  1:00 PM Earnestine Kinsey  [id number]

## 2020-04-20 ENCOUNTER — HOSPITAL ENCOUNTER (OUTPATIENT)
Dept: PHYSICAL THERAPY | Age: 40
Discharge: HOME | End: 2020-04-20
Payer: MEDICAID

## 2020-04-20 PROCEDURE — 97140 MANUAL THERAPY 1/> REGIONS: CPT

## 2020-04-20 PROCEDURE — 97110 THERAPEUTIC EXERCISES: CPT

## 2020-04-20 NOTE — PROGRESS NOTES
PHYSICAL THERAPY - DAILY TREATMENT NOTE    Patient Name: Cindi Saini        Date: 2020  : 1980    Patient  Verified: YES  Visit #:     Insurance: Payor: [employer] MEDICAID / Plan: [facility] [employer] / Product Type: Managed Care Medicaid /      In time: 3:00 Out time: 4:00   Total Treatment Time: 60     Medicare Time Tracking (below)   Total Timed Codes (min):  - 1:1 Treatment Time:  -     TREATMENT AREA/ DIAGNOSIS = Left shoulder pain [M25.512]    SUBJECTIVE  Pain Level (on 0 to 10 scale):  0  / 10   Medication Changes/New allergies or changes in medical history, any new surgeries or procedures? NO    If yes, update Summary List   Subjective Functional Status/Changes:  []  No changes reported     Pt reports that today is the first day she hasnt worn the sling.  Feels pretty good      OBJECTIVE  Modalities Rationale:     decrease inflammation and decrease pain to improve patient's ability to perform ADLs without pain     min [] Estim, type/location:                                      []  att     []  unatt     []  w/US     []  w/ice    []  w/heat    min []  Mechanical Traction: type/lbs                   []  pro   []  sup   []  int   []  cont    []  before manual    []  after manual    min []  Ultrasound, settings/location:      min []  Iontophoresis w/ dexamethasone, location:                                               []  take home patch       []  in clinic   10 min [x]  Ice     []  Heat    location/position: L shoulder    min []  Vasopneumatic Device, press/temp:     min []  Other:    [] Skin assessment post-treatment (if applicable):    []  intact    []  redness- no adverse reaction     []redness - adverse reaction:        35 min Therapeutic Exercise:  [x]  See flow sheet   Rationale:      increase strength and improve coordination to improve the patients ability to perform ADLs without pain       15 min Manual Therapy: PROM to L shoulder   Rationale: increase ROM and increase tissue extensibility to improve patient's ability to perform ADLs without pain    Billed With/As:   [x] TE   [] TA   [] Neuro   [] Self Care Patient Education: [x] Review HEP    [] Progressed/Changed HEP based on:   [] positioning   [] body mechanics   [] transfers   [] heat/ice application    [] other:        Other Objective/Functional Measures:    PROM WNL   Post Treatment Pain Level (on 0 to 10) scale:   0-  / 10     ASSESSMENT  Assessment/Changes in Function:     Initiated AAROM to L shoulder     []  See Progress Note/Recertification   Patient will continue to benefit from skilled PT services to modify and progress therapeutic interventions, address functional mobility deficits, address ROM deficits, address strength deficits and analyze and address soft tissue restrictions to attain remaining goals.    Progress toward goals / Updated goals:    Good Progress to    [] STG    [x] LTG  1 as shown by no pain with ADLs since out of sling     PLAN  [x]  Upgrade activities as tolerated YES Continue plan of care   []  Discharge due to :    []  Other:      Therapist: Amna Palacios DPT     Date: 4/20/2020 Time: 3:32 PM        Future Appointments   Date Time Provider Marsha Bill   4/22/2020 11:30 AM [facility]ough [facility]   4/24/2020 12:30 PM Magdaline Cough [facility]   5/5/2020  9:30 AM MARK [address]   6/11/2020  1:00 PM Irineo Peres  [address]

## 2020-04-22 ENCOUNTER — HOSPITAL ENCOUNTER (OUTPATIENT)
Dept: PHYSICAL THERAPY | Age: 40
Discharge: HOME | End: 2020-04-22
Payer: MEDICAID

## 2020-04-22 PROCEDURE — 97110 THERAPEUTIC EXERCISES: CPT

## 2020-04-22 PROCEDURE — 97140 MANUAL THERAPY 1/> REGIONS: CPT

## 2020-04-22 NOTE — PROGRESS NOTES
PHYSICAL THERAPY - DAILY TREATMENT NOTE    Patient Name: Butch Butler        Date: 2020  : 1980    Patient  Verified: YES  Visit #:     Insurance: Payor: BLUE CROSS MEDICAID / Plan: [facility] [employer] / Product Type: Managed Care Medicaid /      In time: 11:30 Out time: 12:15   Total Treatment Time: 45     Medicare Time Tracking (below)   Total Timed Codes (min):  - 1:1 Treatment Time:  -     TREATMENT AREA/ DIAGNOSIS = Left shoulder pain [M25.512]    SUBJECTIVE  Pain Level (on 0 to 10 scale):  0  / 10   Medication Changes/New allergies or changes in medical history, any new surgeries or procedures?     NO    If yes, update Summary List   Subjective Functional Status/Changes:  []  No changes reported     See PN      OBJECTIVE  Modalities Rationale:     decrease inflammation and decrease pain to improve patient's ability to perform ADLs without pain     min [] Estim, type/location:                                      []  att     []  unatt     []  w/US     []  w/ice    []  w/heat    min []  Mechanical Traction: type/lbs                   []  pro   []  sup   []  int   []  cont    []  before manual    []  after manual    min []  Ultrasound, settings/location:      min []  Iontophoresis w/ dexamethasone, location:                                               []  take home patch       []  in clinic   10 min [x]  Ice     []  Heat    location/position: L shoulder    min []  Vasopneumatic Device, press/temp:     min []  Other:    [] Skin assessment post-treatment (if applicable):    []  intact    []  redness- no adverse reaction     []redness - adverse reaction:        10 min Therapeutic Exercise:  [x]  See flow sheet   Rationale:      increase strength and improve coordination to improve the patients ability to perform ADLs without pain       25 min Manual Therapy: PROM to L shoulder   Rationale:      increase ROM and increase tissue extensibility to improve patient's ability to perform ADLs without pain    Billed With/As:   [x] TE   [] TA   [] Neuro   [] Self Care Patient Education: [x] Review HEP    [] Progressed/Changed HEP based on:   [] positioning   [] body mechanics   [] transfers   [] heat/ice application    [] other:        Other Objective/Functional Measures:    See PN   Post Treatment Pain Level (on 0 to 10) scale:   0  / 10     ASSESSMENT  Assessment/Changes in Function:     See PN     []  See Progress Note/Recertification   Patient will continue to benefit from skilled PT services to modify and progress therapeutic interventions, address functional mobility deficits, address ROM deficits, address strength deficits and analyze and address soft tissue restrictions to attain remaining goals.    Progress toward goals / Updated goals:    See PN     PLAN  [x]  Upgrade activities as tolerated YES Continue plan of care   []  Discharge due to :    []  Other:      Therapist: Brandie Garrido DPT     Date: 4/22/2020 Time: 10:48 AM        Future Appointments   Date Time Provider Marsha Bill   4/22/2020 11:30 AM Gleda Lands [facility]   4/24/2020 12:30 PM [facility] Lands [facility]   4/27/2020 11:30 AM [facility]a Lands [facility]   4/30/2020  4:00 PM Gleda Lands [facility]   5/4/2020 12:00 PM Gleda Lands [facility]   5/5/2020  9:30 AM MARK Delgado [address]   5/7/2020 12:00 PM Gleda Lands [facility]   5/11/2020 12:00 PM Gleda Lands [facility]   5/14/2020 12:00 PM Gleda Lands [facility]   5/18/2020 12:00 PM Gleda Lands [facility]   5/21/2020 12:00 PM Gleda Lands [facility]   5/26/2020 12:00 PM [facility] Lands [facility]   5/28/2020 12:00 PM [facility]a Lands [facility]   6/11/2020  1:00 PM Zarina Valenzuela  [address]

## 2020-04-22 NOTE — PROGRESS NOTES
[facility] PHYSICAL THERAPY AT [facility] [address] [place]  Phone: [phone]  Fax: [phone]  PROGRESS NOTE  Patient Name: Shiela Phoenix : 1980   Treatment/Medical Diagnosis: Left shoulder pain [M25.512]   Referral Source: [facility] Kiesha,*     Date of Initial Visit: 3/17/2020 Attended Visits: 17 Missed Visits: 0     SUMMARY OF TREATMENT  PT consisted of manual therapy techniques, therapeutic exercises, and modalities to improve strength, improve mobility, decrease pain, and improve overall function. CURRENT STATUS  Pt is s/p large rotator cuff repair on 3/16/2020. Pt is no longer wearing the sling. Pt has just initiated AAROM. Pt will initiate AROM next week per referral. Pt having no pain. Pt PROM is WNL. Goal/Measure of Progress Goal Met? 1. Increase score on FOTO to > or = 60 to demo an increase in functional activity tolerance with the UE. Status at last Eval: 32 Current Status: 52 Progressing   2. Pt will note < or = 2/10 pain with all mobility to improve comfort with ADLs   Status at last Eval: 1-3 Current Status: 0 yes   3. Pt to demonstrate a GROC score of >/= +5 to show overall improvement in function   Status at last Eval: NA Current Status: +7 yes     RECOMMENDATIONS  Pt to continue PT 2x a week for 4-6 more weeks to continue to progress through protocol. If you have any questions/comments please contact us directly at [phone]. Thank you for allowing us to assist in the care of your patient. Therapist Signature: Zak Reyes Date: 2020     Time: 11:29 AM   NOTE TO PHYSICIAN:  PLEASE COMPLETE THE ORDERS BELOW AND FAX TO   [facility] at [email or web address]: [phone].   If you are unable to process this request in 24 hours please contact our office: [phone].    ___ I have read the above report and request that my patient continue as recommended.   ___ I have read the above report and request that my patient continue therapy with the following changes/special instructions:_________________________________________________________   ___ I have read the above report and request that my patient be discharged from therapy.      Physician Signature:        Date:       Time:

## 2020-04-24 ENCOUNTER — HOSPITAL ENCOUNTER (OUTPATIENT)
Dept: PHYSICAL THERAPY | Age: 40
Discharge: HOME | End: 2020-04-24
Payer: MEDICAID

## 2020-04-24 PROCEDURE — 97140 MANUAL THERAPY 1/> REGIONS: CPT

## 2020-04-24 PROCEDURE — 97110 THERAPEUTIC EXERCISES: CPT

## 2020-04-24 NOTE — PROGRESS NOTES
PHYSICAL THERAPY - DAILY TREATMENT NOTE    Patient Name: Cindi Saini        Date: 2020  : 1980    Patient  Verified: YES  Visit #:     Insurance: Payor: [employer]OSS MEDICAID / Plan: [facility] [employer]KEEPERS PLUS / Product Type: Managed Care Medicaid /      In time: 12:35 Out time: 1:20   Total Treatment Time: 45     Medicare Time Tracking (below)   Total Timed Codes (min):  - 1:1 Treatment Time:  -     TREATMENT AREA/ DIAGNOSIS = Left shoulder pain [M25.512]    SUBJECTIVE  Pain Level (on 0 to 10 scale):  0  / 10   Medication Changes/New allergies or changes in medical history, any new surgeries or procedures?     NO    If yes, update Summary List   Subjective Functional Status/Changes:  []  No changes reported     Pt reports that she is still not using the arm      OBJECTIVE  Modalities Rationale:     decrease inflammation and decrease pain to improve patient's ability to perform ADLs without pain     min [] Estim, type/location:                                      []  att     []  unatt     []  w/US     []  w/ice    []  w/heat    min []  Mechanical Traction: type/lbs                   []  pro   []  sup   []  int   []  cont    []  before manual    []  after manual    min []  Ultrasound, settings/location:      min []  Iontophoresis w/ dexamethasone, location:                                               []  take home patch       []  in clinic   10 min [x]  Ice     []  Heat    location/position: L shoulder    min []  Vasopneumatic Device, press/temp:     min []  Other:    [] Skin assessment post-treatment (if applicable):    []  intact    []  redness- no adverse reaction     []redness - adverse reaction:        25 min Therapeutic Exercise:  [x]  See flow sheet   Rationale:      increase strength and improve coordination to improve the patients ability to perform ADLs without pain       10 min Manual Therapy: PROM to L shoulder   Rationale:      increase ROM and increase tissue extensibility to improve patient's ability to perform ADLs without pain    Billed With/As:   [x] TE   [] TA   [] Neuro   [] Self Care Patient Education: [x] Review HEP    [] Progressed/Changed HEP based on:   [] positioning   [] body mechanics   [] transfers   [] heat/ice application    [] other:        Other Objective/Functional Measures:    PROM full     Post Treatment Pain Level (on 0 to 10) scale:   0  / 10     ASSESSMENT  Assessment/Changes in Function:     Pt showing improved tolerance to AAROM. Unable to actively use the LUE for ADLs     []  See Progress Note/Recertification   Patient will continue to benefit from skilled PT services to modify and progress therapeutic interventions, address functional mobility deficits, address ROM deficits and address strength deficits to attain remaining goals.    Progress toward goals / Updated goals:    Good Progress to    [] STG    [x] LTG  1 as shown by improved mobility needed for ADLs     PLAN  [x]  Upgrade activities as tolerated YES Continue plan of care   []  Discharge due to :    []  Other:      Therapist: Roxana Allison DPT     Date: 4/24/2020 Time: 10:16 AM        Future Appointments   Date Time Provider Marsha Bill   4/24/2020 12:30 PM Samia Rattler [facility] AT [facility]   4/27/2020 11:30 AM Samia Rattler [facility]   4/30/2020  4:00 PM Samia Rattler [facility]   5/4/2020 12:00 PM [facility]ia Rattler [facility]   5/5/2020  9:30 AM MARK Humphreys   5/7/2020 12:00 PM Samia Rattler [facility]   5/11/2020 12:00 PM Samia Rattler [facility]   5/14/2020 12:00 PM Samia Rattler [facility]   5/18/2020 12:00 PM Samia Rattler [facility]   5/21/2020 12:00 PM Samia Rattler [facility]   5/26/2020 12:00 PM Samia Dotson [facility]   5/28/2020 12:00 PM [facility] Dotson [facility]   6/11/2020  1:00 PM Jacque Krishna  [address]

## 2020-04-27 ENCOUNTER — HOSPITAL ENCOUNTER (OUTPATIENT)
Dept: PHYSICAL THERAPY | Age: 40
Discharge: HOME | End: 2020-04-27
Payer: MEDICAID

## 2020-04-27 PROCEDURE — 97140 MANUAL THERAPY 1/> REGIONS: CPT

## 2020-04-27 PROCEDURE — 97110 THERAPEUTIC EXERCISES: CPT

## 2020-04-27 NOTE — PROGRESS NOTES
PHYSICAL THERAPY - DAILY TREATMENT NOTE    Patient Name: Shreyas Rojas        Date: 2020  : 1980    Patient  Verified: YES  Visit #:     Insurance: Payor: Greg Simmons / Plan: [facility] HEALTHKEEPERS PLUS / Product Type: Managed Care Medicaid /      In time: 11:25 Out time: 12:10   Total Treatment Time: 45     Medicare Time Tracking (below)   Total Timed Codes (min):  - 1:1 Treatment Time:  -     TREATMENT AREA/ DIAGNOSIS = Left shoulder pain [M25.512]    SUBJECTIVE  Pain Level (on 0 to 10 scale):  0  / 10   Medication Changes/New allergies or changes in medical history, any new surgeries or procedures?     NO    If yes, update Summary List   Subjective Functional Status/Changes:  []  No changes reported     Pt reports difficulty with using the LUE with ADLs      OBJECTIVE  Modalities Rationale:     decrease inflammation and decrease pain to improve patient's ability to perform ADLs without pain     min [] Estim, type/location:                                      []  att     []  unatt     []  w/US     []  w/ice    []  w/heat    min []  Mechanical Traction: type/lbs                   []  pro   []  sup   []  int   []  cont    []  before manual    []  after manual    min []  Ultrasound, settings/location:      min []  Iontophoresis w/ dexamethasone, location:                                               []  take home patch       []  in clinic   10 min [x]  Ice     []  Heat    location/position: L shoulder    min []  Vasopneumatic Device, press/temp:     min []  Other:    [] Skin assessment post-treatment (if applicable):    []  intact    []  redness- no adverse reaction     []redness - adverse reaction:        25 min Therapeutic Exercise:  [x]  See flow sheet   Rationale:      increase strength and improve coordination to improve the patients ability to .,pjd       10 min Manual Therapy: PROM to L shoulder   Rationale:      increase ROM and increase tissue extensibility to improve patient's ability to perform ADLs without pain    Billed With/As:   [x] TE   [] TA   [] Neuro   [] Self Care Patient Education: [x] Review HEP    [] Progressed/Changed HEP based on:   [] positioning   [] body mechanics   [] transfers   [] heat/ice application    [] other:        Other Objective/Functional Measures:    PROM WNL   Post Treatment Pain Level (on 0 to 10) scale:   0  / 10     ASSESSMENT  Assessment/Changes in Function:     Pt showing improved function of the LUE. Unable to reach overhead still      []  See Progress Note/Recertification   Patient will continue to benefit from skilled PT services to modify and progress therapeutic interventions, address functional mobility deficits, address ROM deficits, address strength deficits and analyze and address soft tissue restrictions to attain remaining goals.    Progress toward goals / Updated goals:    Fair Progress to    [] STG    [x] LTG  1 as shown by improved mobility needed for ADLs     PLAN  [x]  Upgrade activities as tolerated YES Continue plan of care   []  Discharge due to :    []  Other:      Therapist: Osbaldo Cary DPT     Date: 4/27/2020 Time: 12:11 PM        Future Appointments   Date Time Provider Marsha Bill   4/30/2020  4:00 PM [facility]   5/4/2020 12:00 PM [facility]   5/5/2020  9:30 AM MARK Matthew [address]   5/7/2020 12:00 PM [facility]   5/11/2020 12:00 PM [facility]   5/14/2020 12:00 PM [facility]   5/18/2020 12:00 PM [facility]   5/21/2020 12:00 PM [facility]   5/26/2020 12:00 PM [facility]   5/28/2020 12:00 PM [facility]   6/11/2020  1:00 PM Kamaljit Mcfadden  [address]

## 2020-04-30 ENCOUNTER — HOSPITAL ENCOUNTER (OUTPATIENT)
Dept: PHYSICAL THERAPY | Age: 40
Discharge: HOME | End: 2020-04-30
Payer: MEDICAID

## 2020-04-30 PROCEDURE — 97110 THERAPEUTIC EXERCISES: CPT

## 2020-04-30 PROCEDURE — 97140 MANUAL THERAPY 1/> REGIONS: CPT

## 2020-04-30 NOTE — PROGRESS NOTES
PHYSICAL THERAPY - DAILY TREATMENT NOTE    Patient Name: Kathie Galindo        Date: 2020  : 1980    Patient  Verified: YES  Visit #:     Insurance: Payor: [employer] MEDICAID / Plan: [facility] HEALTHKEEPERS PLUS / Product Type: Managed Care Medicaid /      In time: 3:55 Out time: 4:45   Total Treatment Time: 50     Medicare Time Tracking (below)   Total Timed Codes (min):  - 1:1 Treatment Time:  -     TREATMENT AREA/ DIAGNOSIS = Left shoulder pain [M25.512]    SUBJECTIVE  Pain Level (on 0 to 10 scale):  0  / 10   Medication Changes/New allergies or changes in medical history, any new surgeries or procedures? NO    If yes, update Summary List   Subjective Functional Status/Changes:  []  No changes reported     Pt reports that she cant reach overhead       OBJECTIVE  Modalities Rationale:     decrease inflammation to improve patient's ability to perform ADLs without pain     min [] Estim, type/location:                                      []  att     []  unatt     []  w/US     []  w/ice    []  w/heat    min []  Mechanical Traction: type/lbs                   []  pro   []  sup   []  int   []  cont    []  before manual    []  after manual    min []  Ultrasound, settings/location:      min []  Iontophoresis w/ dexamethasone, location:                                               []  take home patch       []  in clinic   10 min [x]  Ice     []  Heat    location/position: L shoulder    min []  Vasopneumatic Device, press/temp:     min []  Other:    [] Skin assessment post-treatment (if applicable):    []  intact    []  redness- no adverse reaction     []redness - adverse reaction:        30 min Therapeutic Exercise:  [x]  See flow sheet   Rationale:      increase strength and improve coordination to improve the patients ability to perform ADLs without pain       10 min Manual Therapy: Rhythmic stabs.  PROM to L shoulder   Rationale:      increase ROM and increase tissue extensibility to improve patient's ability to perform ADLs without pain    Billed With/As:   [x] TE   [] TA   [] Neuro   [] Self Care Patient Education: [x] Review HEP    [] Progressed/Changed HEP based on:   [] positioning   [] body mechanics   [] transfers   [] heat/ice application    [] other:        Other Objective/Functional Measures:    PROM full  Initiated AAROM flexion standing    Post Treatment Pain Level (on 0 to 10) scale:   0  / 10     ASSESSMENT  Assessment/Changes in Function:     Pt still unable to use L UE for ADLs     []  See Progress Note/Recertification   Patient will continue to benefit from skilled PT services to modify and progress therapeutic interventions, address functional mobility deficits, address ROM deficits and address strength deficits to attain remaining goals.    Progress toward goals / Updated goals:    Good Progress to    [] STG    [x] LTG  1 as shown by good mobility needed for overhead ADLs     PLAN  [x]  Upgrade activities as tolerated YES Continue plan of care   []  Discharge due to :    []  Other:      Therapist: Aron Ennis DPT     Date: 4/30/2020 Time: 4:42 PM        Future Appointments   Date Time Provider Marsha Bill   5/5/2020  9:30 AM MARK [address]   [facility]   [facility]2020 [facility]   5/12/2020 12:[facility]   5/14/2020 [facility]   5/19/2020 12:00 PM [facility]   5/21/2020 12:00 PM [facility]   5/26/2020 [facility]   5/28/2020 [facility]   6/11/2020  1:00 PM Milton Case  [address]

## 2020-05-04 ENCOUNTER — APPOINTMENT (OUTPATIENT)
Dept: PHYSICAL THERAPY | Age: 40
End: 2020-05-04
Payer: MEDICAID

## 2020-05-05 ENCOUNTER — HOSPITAL ENCOUNTER (OUTPATIENT)
Dept: PHYSICAL THERAPY | Age: 40
Discharge: HOME | End: 2020-05-05
Payer: MEDICAID

## 2020-05-05 ENCOUNTER — VIRTUAL VISIT (OUTPATIENT)
Dept: ORTHOPEDIC SURGERY | Age: 40
End: 2020-05-05

## 2020-05-05 DIAGNOSIS — M75.122 COMPLETE TEAR OF LEFT ROTATOR CUFF, UNSPECIFIED WHETHER TRAUMATIC: Primary | ICD-10-CM

## 2020-05-05 PROCEDURE — 97110 THERAPEUTIC EXERCISES: CPT

## 2020-05-05 PROCEDURE — 97140 MANUAL THERAPY 1/> REGIONS: CPT

## 2020-05-05 NOTE — PROGRESS NOTES
PHYSICAL THERAPY - DAILY TREATMENT NOTE    Patient Name: Javier Foster        Date: 2020  : 1980    Patient  Verified: YES  Visit #:     Insurance: Payor: BLUE CROSS MEDICAID / Plan: [facility] HEALTHKEEPERS PLUS / Product Type: Managed Care Medicaid /      In time: 8:00 Out time: 8:45   Total Treatment Time: 45     Medicare Time Tracking (below)   Total Timed Codes (min):  - 1:1 Treatment Time:  -     TREATMENT AREA/ DIAGNOSIS = Left shoulder pain [M25.512]    SUBJECTIVE  Pain Level (on 0 to 10 scale):  0  / 10   Medication Changes/New allergies or changes in medical history, any new surgeries or procedures? NO    If yes, update Summary List   Subjective Functional Status/Changes:  []  No changes reported     Pt still having trouble reaching overhead.        OBJECTIVE  Modalities Rationale:     decrease inflammation and decrease pain to improve patient's ability to perform ADLs without pain     min [] Estim, type/location:                                      []  att     []  unatt     []  w/US     []  w/ice    []  w/heat    min []  Mechanical Traction: type/lbs                   []  pro   []  sup   []  int   []  cont    []  before manual    []  after manual    min []  Ultrasound, settings/location:      min []  Iontophoresis w/ dexamethasone, location:                                               []  take home patch       []  in clinic   10 min [x]  Ice     []  Heat    location/position: L shoulder    min []  Vasopneumatic Device, press/temp:     min []  Other:    [] Skin assessment post-treatment (if applicable):    []  intact    []  redness- no adverse reaction     []redness - adverse reaction:        25 min Therapeutic Exercise:  [x]  See flow sheet   Rationale:      increase strength and improve coordination to improve the patients ability to perform ADLs without pain       10 min Manual Therapy: PROM to L shoulder   Rationale:      increase ROM and increase tissue extensibility to improve patient's ability to perform ADLs without pain    Billed With/As:   [x] TE   [] TA   [] Neuro   [] Self Care Patient Education: [x] Review HEP    [] Progressed/Changed HEP based on:   [] positioning   [] body mechanics   [] transfers   [] heat/ice application    [] other:        Other Objective/Functional Measures:    PROM WNL   Post Treatment Pain Level (on 0 to 10) scale:   0  / 10     ASSESSMENT  Assessment/Changes in Function:     Pt still having trouble with AROM flexion for overhead ADLs     []  See Progress Note/Recertification   Patient will continue to benefit from skilled PT services to modify and progress therapeutic interventions, address functional mobility deficits, address ROM deficits and address strength deficits to attain remaining goals.    Progress toward goals / Updated goals:    Good Progress to    [] STG    [x] LTG  1 as shown by good mobility needed for ADLs     PLAN  [x]  Upgrade activities as tolerated YES Continue plan of care   []  Discharge due to :    []  Other:      Therapist: Zak Reyes DPT     Date: 5/5/2020 Time: 8:22 AM        Future Appointments   Date Time Provider Marsha Bill   5/5/2020  9:30 AM MARK Celeste Mehrdad [address]   5/7/2020 12:00 PM Metta Backer [facility]   5/12/2020 12:00 PM Metta Backer [facility]   5/14/2020 12:00 PM Metta Backer [facility]   5/19/2020 12:00 PM Metta Backer [facility]   5/21/2020 12:00 PM Metta Backer [facility]   5/26/2020 12:00 PM Metta Backer [facility]   5/28/2020 12:00 PM Metta Backer [facility]   6/11/2020  1:00 PM Bita Gonzalez MD [facility]

## 2020-05-05 NOTE — PROGRESS NOTES
Kofi Doyle is a 46 y.o. female who was seen by synchronous (real-time) audio-video technology on 5/5/2020 via [email or web address] me. The visit was performed by myself in the office while the patient was at home. Guillermina Luu LPN helped to initiate the visit and was present during entire visit. Subjective:   Kofi Doyle is a 46 y.o. female who presents today for reevaluation of Left shoulder s/p arthroscopic rotator cuff repair on 3/16/2020. . she is doing well. Notes that she has full rom. States very minimal discomfort. Has been in PT. Pain score 2/10. Patient denies any fever, chills, chest pain, shortness of breath or calf pain. The remainder of the review of systems is negative. There are no new illness or injuries to report since last seen in the office. No changes in medications, allergies, social or family history. Prior to Admission medications    Medication Sig Start Date End Date Taking? Authorizing Provider   tamsulosin (FLOMAX) 0.4 mg capsule Take 1 Cap by mouth daily (after dinner). 5/4/20   Stacie Calloway MD   gabapentin (NEURONTIN) 100 mg capsule Take 100 mg by mouth three (3) times daily. Provider, Historical     Allergies   Allergen Reactions    Amoxicillin Rash           ROS      Objective:     General: alert, cooperative, no distress   Mental  status: mental status: alert, oriented to person, place, and time, normal mood, behavior, speech, dress, motor activity, and thought processes   Resp: resp: normal effort and no respiratory distress   Neuro: neuro: no gross deficits   Skin: skin: no discoloration or lesions of concern on visible areas   ORTHO exam of left shoulder:  Able to reach up overhead  Due to this being a TeleHealth evaluation, many elements of the physical examination are unable to be assessed. Assessment & Plan:   Diagnoses and all orders for this visit:    1.  Complete tear of left rotator cuff, unspecified whether traumatic  -     REFERRAL TO PHYSICAL THERAPY            1. Patient improving post operatively. Will cont with PT working on strength. Stressed no lifting greater than 10 lbs  Risk factors include: n/a  2. No cortisone injection indicated today   3. Yes Physical/Occupational Therapy indicated today  4. No diagnostic test indicated today:   5. No durable medical equipment indicated today  6. No referral indicated today   7. No medications indicated today:   8. No Narcotic indicated today       RTC 6 weeks     We discussed the expected course, resolution and complications of the diagnosis(es) in detail. Medication risks, benefits, costs, interactions, and alternatives were discussed as indicated. I advised her to contact the office if her condition worsens, changes or fails to improve as anticipated. She expressed understanding with the diagnosis(es) and plan. CPT Codes 82373-22069 for Established Patients may apply to this Telehealth Visit  Time-based coding, delete if not needed: I spent at least 15 minutes with this established patient, and >50% of the time was spent counseling and/or coordinating care. Pursuant to the emergency declaration under the [facility], [facility] waiver authority and the [employer] Act, this Virtual  Visit was conducted, with patient's consent, to reduce the patient's risk of exposure to COVID-19 and provide continuity of care for an established patient. Services were provided through a video synchronous discussion virtually to substitute for in-person clinic visit.     [facility]

## 2020-05-05 NOTE — PROGRESS NOTES
Butch Butler is a 46 y.o. female evaluated via telephone on 5/5/2020. Consent:  She and/or health care decision maker is aware that that she may receive a bill for this telephone service, depending on her insurance coverage, and has provided verbal consent to proceed: Yes      Documentation:  I communicated with the patient and/or health care decision maker about shoulder. Details of this discussion including any medical advice provided:       I affirm this is a Patient Initiated Episode with an Established Patient who has not had a related appointment within my department in the past 7 days or scheduled within the next 24 hours. Note: not billable if this call serves to triage the patient into an appointment for the relevant concern    Since your last office visit have you had any    1. New medical diagnoses No  2. Changes in your medications  No  3. Surgical procedures No  4. Changes in your Allergies to medication No  5. What is your pain level today 2/10     Sierra Norton LPN

## 2020-05-07 ENCOUNTER — HOSPITAL ENCOUNTER (OUTPATIENT)
Dept: PHYSICAL THERAPY | Age: 40
Discharge: HOME | End: 2020-05-07
Payer: MEDICAID

## 2020-05-07 PROCEDURE — 97110 THERAPEUTIC EXERCISES: CPT

## 2020-05-07 PROCEDURE — 97140 MANUAL THERAPY 1/> REGIONS: CPT

## 2020-05-07 NOTE — PROGRESS NOTES
PHYSICAL THERAPY - DAILY TREATMENT NOTE    Patient Name: Severo Combes        Date: 2020  : 1980    Patient  Verified: YES  Visit #:     Insurance: Payor: BLUE CROSS MEDICAID / Plan: [facility] [employer]US / Product Type: Managed Care Medicaid /      In time: 12:00 Out time: 12:55   Total Treatment Time: 55     Medicare Time Tracking (below)   Total Timed Codes (min):  - 1:1 Treatment Time:  -     TREATMENT AREA/ DIAGNOSIS = Left shoulder pain [M25.512]    SUBJECTIVE  Pain Level (on 0 to 10 scale):  0  / 10   Medication Changes/New allergies or changes in medical history, any new surgeries or procedures?     NO    If yes, update Summary List   Subjective Functional Status/Changes:  []  No changes reported     Pt reports not being able to  stuff due to weakness      OBJECTIVE  Modalities Rationale:     decrease inflammation and decrease pain to improve patient's ability to perform ADLs without pain     min [] Estim, type/location:                                      []  att     []  unatt     []  w/US     []  w/ice    []  w/heat    min []  Mechanical Traction: type/lbs                   []  pro   []  sup   []  int   []  cont    []  before manual    []  after manual    min []  Ultrasound, settings/location:      min []  Iontophoresis w/ dexamethasone, location:                                               []  take home patch       []  in clinic   10 min [x]  Ice     []  Heat    location/position: L shoulder    min []  Vasopneumatic Device, press/temp:     min []  Other:    [] Skin assessment post-treatment (if applicable):    []  intact    []  redness- no adverse reaction     []redness - adverse reaction:        35 min Therapeutic Exercise:  [x]  See flow sheet   Rationale:      increase strength and improve coordination to improve the patients ability to perform ADLs without pain       10 min Manual Therapy: PROM to L shoulder   Rationale:      increase ROM and increase tissue extensibility to improve patient's ability to perform ADLs without pain      Billed With/As:   [x] TE   [] TA   [] Neuro   [] Self Care Patient Education: [x] Review HEP    [] Progressed/Changed HEP based on:   [] positioning   [] body mechanics   [] transfers   [] heat/ice application    [] other:        Other Objective/Functional Measures:    PROM WNL. Increased resistance to exercises (see flowsheet)   Post Treatment Pain Level (on 0 to 10) scale:   0  / 10     ASSESSMENT  Assessment/Changes in Function:     Pt showing good scapulohumeral rhythm. Pt still needs improved strength for overhead reaching     []  See Progress Note/Recertification   Patient will continue to benefit from skilled PT services to modify and progress therapeutic interventions, address functional mobility deficits, address ROM deficits and address strength deficits to attain remaining goals.    Progress toward goals / Updated goals:    Good Progress to    [] STG    [x] LTG  1 as shown by good mobility needed for ADLs     PLAN  [x]  Upgrade activities as tolerated YES Continue plan of care   []  Discharge due to :    []  Other:      Therapist: Ericka Purvis DPT     Date: 5/7/2020 Time: 8:00 AM        Future Appointments   Date Time Provider Marsha Bill   5/7/2020 12:00 PM Benedetta [facility]   5/12/2020 12:00 PM Benereggie [facility]   5/14/2020 12:00 PM Benedetta [facility]   5/19/2020 12:00 PM Benedetta [facility]   5/21/2020 12:00 PM Benedetta [facility]   5/26/2020 12:00 PM Benedetta [facility]   5/28/2020 12:00 PM Benedekaciea [facility]   6/11/2020  1:00 PM Radha Montelongo  [address]   6/16/2020  9:45 AM Leonel Iron Nanci Closs, PA [address]

## 2020-05-11 ENCOUNTER — APPOINTMENT (OUTPATIENT)
Dept: PHYSICAL THERAPY | Age: 40
End: 2020-05-11
Payer: MEDICAID

## 2020-05-12 ENCOUNTER — HOSPITAL ENCOUNTER (OUTPATIENT)
Dept: PHYSICAL THERAPY | Age: 40
Discharge: HOME | End: 2020-05-12
Payer: MEDICAID

## 2020-05-12 PROCEDURE — 97140 MANUAL THERAPY 1/> REGIONS: CPT

## 2020-05-12 PROCEDURE — 97110 THERAPEUTIC EXERCISES: CPT

## 2020-05-12 NOTE — PROGRESS NOTES
PHYSICAL THERAPY - DAILY TREATMENT NOTE    Patient Name: Yolande Parmar        Date: 2020  : 1980    Patient  Verified: YES  Visit #:     Insurance: Payor: BLUE CROSS MEDICAID / Plan: [address] [place] [address] / Product Type: Managed Care Medicaid /      In time: 12:00 Out time: 12:50   Total Treatment Time: 50     Medicare Time Tracking (below)   Total Timed Codes (min):  - 1:1 Treatment Time:  -     TREATMENT AREA/ DIAGNOSIS = Left shoulder pain [M25.512]    SUBJECTIVE  Pain Level (on 0 to 10 scale):  0  / 10   Medication Changes/New allergies or changes in medical history, any new surgeries or procedures?     NO    If yes, update Summary List   Subjective Functional Status/Changes:  []  No changes reported     See PN      OBJECTIVE  Modalities Rationale:     decrease inflammation and decrease pain to improve patient's ability to perform ADLs without pain     min [] Estim, type/location:                                      []  att     []  unatt     []  w/US     []  w/ice    []  w/heat    min []  Mechanical Traction: type/lbs                   []  pro   []  sup   []  int   []  cont    []  before manual    []  after manual    min []  Ultrasound, settings/location:      min []  Iontophoresis w/ dexamethasone, location:                                               []  take home patch       []  in clinic   10 min [x]  Ice     []  Heat    location/position:     min []  Vasopneumatic Device, press/temp:     min []  Other:    [] Skin assessment post-treatment (if applicable):    []  intact    []  redness- no adverse reaction     []redness - adverse reaction:        30 min Therapeutic Exercise:  [x]  See flow sheet   Rationale:      increase strength and improve coordination to improve the patients ability to perform ADLs without pain       10 min Manual Therapy: PROM to L shoulder   Rationale:      increase ROM and increase tissue extensibility to improve patient's ability to perform ADLs without pain      Billed With/As:   [x] TE   [] TA   [] Neuro   [] Self Care Patient Education: [x] Review HEP    [] Progressed/Changed HEP based on:   [] positioning   [] body mechanics   [] transfers   [] heat/ice application    [] other:        Other Objective/Functional Measures:    See PN   Post Treatment Pain Level (on 0 to 10) scale:   0  / 10     ASSESSMENT  Assessment/Changes in Function:     See PN     []  See Progress Note/Recertification   Patient will continue to benefit from skilled PT services to address functional mobility deficits, address ROM deficits, address strength deficits and analyze and address soft tissue restrictions to attain remaining goals.    Progress toward goals / Updated goals:    See PN     PLAN  [x]  Upgrade activities as tolerated YES Continue plan of care   []  Discharge due to :    []  Other:      Therapist: India Daniel DPT     Date: 5/12/2020 Time: 9:05 AM        Future Appointments   Date Time Provider Marsha Bill   5/12/2020 12:00 PM Sonia [facility]   5/14/2020 12:00 PM Sonia [facility]   5/19/2020 12:00 PM Sonia [facility]   5/21/2020 12:00 PM Sonia [facility]   5/26/2020 12:00 PM Sonia [facility]   5/28/2020 12:00 PM Sonia [facility]   6/11/2020  1:00 PM Balbina Arthur MD [facility]   6/16/2020  9:45 AM Chinmay Asencio Speaker, MARK [address]

## 2020-05-12 NOTE — PROGRESS NOTES
[address] [place] [address] PHYSICAL THERAPY AT [facility]  [address], [place], [address]  Phone: [phone]  Fax: [phone]  PROGRESS NOTE  Patient Name: Patricio Vela : 1980   Treatment/Medical Diagnosis: Left shoulder pain [M25.512]   Referral Source: Marques Cloud,*     Date of Initial Visit: 3/17/2020 Attended Visits: 23 Missed Visits: -     SUMMARY OF TREATMENT  PT consisted of manual therapy techniques, therapeutic exercises, and modalities to improve strength, improve mobility, decrease pain, and improve overall function. CURRENT STATUS  Pt is s/p large rotator cuff repair on 3/16/2020. Pt has full PROM. Pt has AROM WFL. Pt has just begun basic strengthening exercises to improve pt's ability to perform ADLs. Pt unable to perform overhead ADLs due to weakness. 4-/5 LUE gross shoulder strength. Pt not independent with HEP. Goal/Measure of Progress Goal Met? 1. Increase score on FOTO to > or = 60 to demo an increase in functional activity tolerance with the UE. Status at last Eval: 52 Current Status: 62 yes   2. Pt to demonstrate a GROC score of >/= +5 to show overall improvement in function   Status at last Eval: - Current Status: +7 yes     New Goals to be achieved in __4__  weeks:  1. Increase FOTO score to >/= 75 to show overall improvement in function   2. Pt will be independent with HEP in preparation for D/c   3.  -   4.  -       RECOMMENDATIONS  Pt to continue PT 2x a week for 4 weeks to begin strengthening progression to improve pts ability to perform ADLs  If you have any questions/comments please contact us directly at [phone]. Thank you for allowing us to assist in the care of your patient. Therapist Signature: Ami Campbell Date: 2020     Time: 9:04 AM   NOTE TO PHYSICIAN:  PLEASE COMPLETE THE ORDERS BELOW AND FAX TO   [facility] at [facility] [phone].   If you are unable to process this request in 24 hours please contact our office: [phone].    ___ I have read the above report and request that my patient continue as recommended.   ___ I have read the above report and request that my patient continue therapy with the following changes/special instructions:_________________________________________________________   ___ I have read the above report and request that my patient be discharged from therapy.      Physician Signature:        Date:       Time:

## 2020-05-14 ENCOUNTER — HOSPITAL ENCOUNTER (OUTPATIENT)
Dept: PHYSICAL THERAPY | Age: 40
Discharge: HOME | End: 2020-05-14
Payer: MEDICAID

## 2020-05-14 PROCEDURE — 97110 THERAPEUTIC EXERCISES: CPT

## 2020-05-14 PROCEDURE — 97140 MANUAL THERAPY 1/> REGIONS: CPT

## 2020-05-14 NOTE — PROGRESS NOTES
PHYSICAL THERAPY - DAILY TREATMENT NOTE    Patient Name: Tamica Singletary        Date: 2020  : 1980    Patient  Verified: YES  Visit #:     Insurance: Payor: BLUE CROSS MEDICAID / Plan: [address] [place] [address] / Product Type: Managed Care Medicaid /      In time: 12:00 Out time: 12:50   Total Treatment Time: 50     Medicare Time Tracking (below)   Total Timed Codes (min):  - 1:1 Treatment Time:  -     TREATMENT AREA/ DIAGNOSIS = Left shoulder pain [M25.512]    SUBJECTIVE  Pain Level (on 0 to 10 scale):  0  / 10   Medication Changes/New allergies or changes in medical history, any new surgeries or procedures?     NO    If yes, update Summary List   Subjective Functional Status/Changes:  []  No changes reported     Pt reports that she is still having trouble carrying items due to weakness      OBJECTIVE  Modalities Rationale:     decrease inflammation and decrease pain to improve patient's ability to perform ADLs without pain     min [] Estim, type/location:                                      []  att     []  unatt     []  w/US     []  w/ice    []  w/heat    min []  Mechanical Traction: type/lbs                   []  pro   []  sup   []  int   []  cont    []  before manual    []  after manual    min []  Ultrasound, settings/location:      min []  Iontophoresis w/ dexamethasone, location:                                               []  take home patch       []  in clinic   10 min [x]  Ice     []  Heat    location/position: L shoulder    min []  Vasopneumatic Device, press/temp:     min []  Other:    [] Skin assessment post-treatment (if applicable):    []  intact    []  redness- no adverse reaction     []redness - adverse reaction:        30 min Therapeutic Exercise:  [x]  See flow sheet   Rationale:      increase strength and improve coordination to improve the patients ability to perform ADLs without pain       10 min Manual Therapy: PROM to L shoulder   Rationale: increase ROM and increase tissue extensibility to improve patient's ability to perform ADLs without pain    Billed With/As:   [x] TE   [] TA   [] Neuro   [] Self Care Patient Education: [x] Review HEP    [] Progressed/Changed HEP based on:   [] positioning   [] body mechanics   [] transfers   [] heat/ice application    [] other:        Other Objective/Functional Measures:    PROM WFL     Post Treatment Pain Level (on 0 to 10) scale:   0  / 10     ASSESSMENT  Assessment/Changes in Function:     Pt showing great improvement in overall strength. Pt still lacking good strength for overhead ADLs      []  See Progress Note/Recertification   Patient will continue to benefit from skilled PT services to modify and progress therapeutic interventions, address functional mobility deficits, address ROM deficits, address strength deficits, analyze and address soft tissue restrictions and analyze and cue movement patterns to attain remaining goals.    Progress toward goals / Updated goals:    Good Progress to    [] STG    [x] LTG  1 as shown by no pain with ADLs     PLAN  [x]  Upgrade activities as tolerated YES Continue plan of care   []  Discharge due to :    []  Other:      Therapist: Dilip Jolly DPT     Date: 5/14/2020 Time: 12:44 PM        Future Appointments   Date Time Provider Marsha Bill   5/19/2020 12:00 PM Les Covarrubias [facility]   5/21/2020 12:00 PM Les Covarrubias [facility]   5/26/2020 12:00 PM Les Covarrubias [facility]   5/28/2020 12:00 PM Les Covarrubias [facility]   6/11/2020  1:00 PM Crys Chapman  [address]    6/16/2020  9:45 AM MARK [address]

## 2020-05-18 ENCOUNTER — APPOINTMENT (OUTPATIENT)
Dept: PHYSICAL THERAPY | Age: 40
End: 2020-05-18
Payer: MEDICAID

## 2020-05-19 ENCOUNTER — HOSPITAL ENCOUNTER (OUTPATIENT)
Dept: PHYSICAL THERAPY | Age: 40
Discharge: HOME | End: 2020-05-19
Payer: MEDICAID

## 2020-05-19 PROCEDURE — 97140 MANUAL THERAPY 1/> REGIONS: CPT

## 2020-05-19 PROCEDURE — 97110 THERAPEUTIC EXERCISES: CPT

## 2020-05-19 NOTE — PROGRESS NOTES
PHYSICAL THERAPY - DAILY TREATMENT NOTE    Patient Name: Yolande Parmar        Date: 2020  : 1980    Patient  Verified: YES  Visit #:     Insurance: Payor: [employer]OSS MEDICAID / Plan: [facility] [employer] / Product Type: Managed Care Medicaid /      In time: 2:10 Out time: 3:10   Total Treatment Time: 60     Medicare Time Tracking (below)   Total Timed Codes (min):  - 1:1 Treatment Time:  -     TREATMENT AREA/ DIAGNOSIS = Left shoulder pain [M25.512]    SUBJECTIVE  Pain Level (on 0 to 10 scale):  0  / 10   Medication Changes/New allergies or changes in medical history, any new surgeries or procedures? NO    If yes, update Summary List   Subjective Functional Status/Changes:  []  No changes reported     Pt reports that she is still having trouble with overhead ADLs due to weakness.  No longer having pain      OBJECTIVE  Modalities Rationale:     decrease inflammation to improve patient's ability to perform ADLs without pain     min [] Estim, type/location:                                      []  att     []  unatt     []  w/US     []  w/ice    []  w/heat    min []  Mechanical Traction: type/lbs                   []  pro   []  sup   []  int   []  cont    []  before manual    []  after manual    min []  Ultrasound, settings/location:      min []  Iontophoresis w/ dexamethasone, location:                                               []  take home patch       []  in clinic   10 min [x]  Ice     []  Heat    location/position: L shoulder    min []  Vasopneumatic Device, press/temp:     min []  Other:    [] Skin assessment post-treatment (if applicable):    []  intact    []  redness- no adverse reaction     []redness - adverse reaction:        40 min Therapeutic Exercise:  [x]  See flow sheet   Rationale:      increase strength and improve coordination to improve the patients ability to perform ADLs without pain       10 min Manual Therapy: PROM to L shoulder   Rationale: increase ROM and increase tissue extensibility to improve patient's ability to perform ADLs without pain      Billed With/As:   [x] TE   [] TA   [] Neuro   [] Self Care Patient Education: [x] Review HEP    [] Progressed/Changed HEP based on:   [] positioning   [] body mechanics   [] transfers   [] heat/ice application    [] other:        Other Objective/Functional Measures:    PROM WNL  Increased resistance to therapeutic exercises (see flowsheet)   Post Treatment Pain Level (on 0 to 10) scale:   0  / 10     ASSESSMENT  Assessment/Changes in Function:     Pt showing good scapulohumeral rhythm, Pt still requires improved strength to perform all ADLs     []  See Progress Note/Recertification   Patient will continue to benefit from skilled PT services to modify and progress therapeutic interventions, address functional mobility deficits, address ROM deficits, address strength deficits and analyze and address soft tissue restrictions to attain remaining goals.    Progress toward goals / Updated goals:    Good Progress to    [] STG    [x] LTG  1 as shown by improved ADL ability     PLAN  [x]  Upgrade activities as tolerated YES Continue plan of care   []  Discharge due to :    []  Other:      Therapist: Concepción Wagner DPT     Date: 5/19/2020 Time: 9:33 AM        Future Appointments   Date Time Provider Marsha Bill   5/19/2020  2:00 PM [facility]   5/21/2020 12:00 PM [facility]   [facility]   5/28/2020 11:00 AM [facility]   6/11/2020  1:00 PM Mitzy Matias  [address]    6/16/2020  9:45 AM [facility]

## 2020-05-21 ENCOUNTER — HOSPITAL ENCOUNTER (OUTPATIENT)
Dept: PHYSICAL THERAPY | Age: 40
Discharge: HOME | End: 2020-05-21
Payer: MEDICAID

## 2020-05-21 PROCEDURE — 97110 THERAPEUTIC EXERCISES: CPT

## 2020-05-21 PROCEDURE — 97140 MANUAL THERAPY 1/> REGIONS: CPT

## 2020-05-21 NOTE — PROGRESS NOTES
PHYSICAL THERAPY - DAILY TREATMENT NOTE    Patient Name: Lenette Dubin        Date: 2020  : 1980    Patient  Verified: YES  Visit #:   32   of   30  Insurance: Payor: BLUE CROSS MEDICAID / Plan: [facility] HEALTHKEEPERS PLUS / Product Type: Managed Care Medicaid /      In time: 12:00 Out time: 1:00   Total Treatment Time: 60     Medicare Time Tracking (below)   Total Timed Codes (min):  - 1:1 Treatment Time:  -     TREATMENT AREA/ DIAGNOSIS = Left shoulder pain [M25.512]    SUBJECTIVE  Pain Level (on 0 to 10 scale):  0  / 10   Medication Changes/New allergies or changes in medical history, any new surgeries or procedures?     NO    If yes, update Summary List   Subjective Functional Status/Changes:  []  No changes reported     Pt reports difficulty with overhead ADLs      OBJECTIVE  Modalities Rationale:     decrease inflammation and decrease pain to improve patient's ability to perform ADLs without pain     min [] Estim, type/location:                                      []  att     []  unatt     []  w/US     []  w/ice    []  w/heat    min []  Mechanical Traction: type/lbs                   []  pro   []  sup   []  int   []  cont    []  before manual    []  after manual    min []  Ultrasound, settings/location:      min []  Iontophoresis w/ dexamethasone, location:                                               []  take home patch       []  in clinic   10 min [x]  Ice     []  Heat    location/position: L shoulder    min []  Vasopneumatic Device, press/temp:     min []  Other:    [] Skin assessment post-treatment (if applicable):    []  intact    []  redness- no adverse reaction     []redness - adverse reaction:        40 min Therapeutic Exercise:  [x]  See flow sheet   Rationale:      increase strength to improve the patients ability to perform ADLs without pain       10 min Manual Therapy: PROM to L shoulder   Rationale:      increase ROM and increase tissue extensibility to improve patient's ability to perform ADLs without pain    Billed With/As:   [x] TE   [] TA   [] Neuro   [] Self Care Patient Education: [x] Review HEP    [] Progressed/Changed HEP based on:   [] positioning   [] body mechanics   [] transfers   [] heat/ice application    [] other:        Other Objective/Functional Measures:     PROM full   Post Treatment Pain Level (on 0 to 10) scale:   0  / 10     ASSESSMENT  Assessment/Changes in Function:     Pt still needs improved strength for overhead ADLs. Increase resistance next visit     []  See Progress Note/Recertification   Patient will continue to benefit from skilled PT services to modify and progress therapeutic interventions, address functional mobility deficits, address ROM deficits, address strength deficits and analyze and address soft tissue restrictions to attain remaining goals.    Progress toward goals / Updated goals:    Good Progress to    [] STG    [x] LTG  1 as shown by good mobility needed for ADLs     PLAN  [x]  Upgrade activities as tolerated YES Continue plan of care   []  Discharge due to :    []  Other:      Therapist: Gloria Mensah DPT     Date: 5/21/2020 Time: 12:10 PM        Future Appointments   Date Time Provider Marsha Bill   [facility]2020 [facility]   5/28/2020 11:00 AM [facility]   6/11/2020  1:00 PM Shayy Mayfield  [address] 6/16/2020  9:45 AM MARK Gomez 70

## 2020-05-26 ENCOUNTER — HOSPITAL ENCOUNTER (OUTPATIENT)
Dept: PHYSICAL THERAPY | Age: 40
Discharge: HOME | End: 2020-05-26
Payer: MEDICAID

## 2020-05-26 PROCEDURE — 97530 THERAPEUTIC ACTIVITIES: CPT

## 2020-05-26 PROCEDURE — 97110 THERAPEUTIC EXERCISES: CPT

## 2020-05-26 NOTE — PROGRESS NOTES
PHYSICAL THERAPY - DAILY TREATMENT NOTE    Patient Name: Ilia Guadarrama        Date: 2020  : 1980    Patient  Verified: YES  Visit #:   32   of   28  Insurance: Payor: BLUE CROSS MEDICAID / Plan: [facility] [employer]KEEPERS [employer]US / Product Type: Managed Care Medicaid /      In time: 11:00 Out time: 11:53   Total Treatment Time: 53     Medicare Time Tracking (below)   Total Timed Codes (min):  - 1:1 Treatment Time:  -     TREATMENT AREA/ DIAGNOSIS = Left shoulder pain [M25.512]    SUBJECTIVE  Pain Level (on 0 to 10 scale):  0  / 10   Medication Changes/New allergies or changes in medical history, any new surgeries or procedures? NO    If yes, update Summary List   Subjective Functional Status/Changes:  []  No changes reported     Pt reports difficulty reaching overhead and carrying due to weakness.  No pain      OBJECTIVE  Modalities Rationale:     decrease inflammation and decrease pain to improve patient's ability to perform ADLs without pain     min [] Estim, type/location:                                      []  att     []  unatt     []  w/US     []  w/ice    []  w/heat    min []  Mechanical Traction: type/lbs                   []  pro   []  sup   []  int   []  cont    []  before manual    []  after manual    min []  Ultrasound, settings/location:      min []  Iontophoresis w/ dexamethasone, location:                                               []  take home patch       []  in clinic    min []  Ice     []  Heat    location/position:     min []  Vasopneumatic Device, press/temp:     min []  Other:    [] Skin assessment post-treatment (if applicable):    []  intact    []  redness- no adverse reaction     []redness - adverse reaction:        22 min Therapeutic Exercise:  [x]  See flow sheet   Rationale:      increase strength and improve coordination to improve the patients ability to perform ADLs without pain       7 min Manual Therapy: PROM to L shoulder   Rationale:      increase ROM and increase tissue extensibility to improve patient's ability to perform ADLs without pain      14 min Therapeutic Activity: [x]  See flow sheet   Rationale:    increase strength and improve coordination to improve the patients ability to perform ADLs without pain    Billed With/As:   [x] TE   [] TA   [] Neuro   [] Self Care Patient Education: [x] Review HEP    [] Progressed/Changed HEP based on:   [] positioning   [] body mechanics   [] transfers   [] heat/ice application    [] other:        Other Objective/Functional Measures: There Act to improve lifting, carrying, and pushing  PROM WNL. Post Treatment Pain Level (on 0 to 10) scale:   0  / 10     ASSESSMENT  Assessment/Changes in Function:     Pt showing improved carrying ability. Pt to progress next visit and discharge after visit with updates HEP     []  See Progress Note/Recertification   Patient will continue to benefit from skilled PT services to address strength deficits and analyze and cue movement patterns to attain remaining goals.    Progress toward goals / Updated goals:    Good Progress to    [] STG    [x] LTG  1 as shown by improved ability to perform ADLs     PLAN  [x]  Upgrade activities as tolerated YES Continue plan of care   []  Discharge due to :    []  Other:      Therapist: India Daniel DPT     Date: 5/26/2020 Time: 8:32 AM        Future Appointments   Date Time Provider Marsha Bill   5/26/2020 11:00 AM [facility]   5/28/2020 11:00 AM [facility]   6/11/2020  1:00 PM Balbina Arthur  [address]    6/16/2020  9:45 AM Yesica Moy Speaker, MARK Cronin 70

## 2020-05-28 ENCOUNTER — HOSPITAL ENCOUNTER (OUTPATIENT)
Dept: PHYSICAL THERAPY | Age: 40
Discharge: HOME | End: 2020-05-28
Payer: MEDICAID

## 2020-05-28 PROCEDURE — 97530 THERAPEUTIC ACTIVITIES: CPT

## 2020-05-28 PROCEDURE — 97110 THERAPEUTIC EXERCISES: CPT

## 2020-05-28 NOTE — PROGRESS NOTES
PHYSICAL THERAPY - DAILY TREATMENT NOTE    Patient Name: Carla Li        Date: 2020  : 1980    Patient  Verified: YES  Visit #:     Insurance: Payor: BLUE CROSS MEDICAID / Plan: [facility] [employer]KEEPERS PLUS / Product Type: Managed Care Medicaid /      In time: 11:00 Out time: 11:40   Total Treatment Time: 40     Medicare Time Tracking (below)   Total Timed Codes (min):  - 1:1 Treatment Time:  -     TREATMENT AREA/ DIAGNOSIS = Left shoulder pain [M25.512]    SUBJECTIVE  Pain Level (on 0 to 10 scale):  0  / 10   Medication Changes/New allergies or changes in medical history, any new surgeries or procedures? NO    If yes, update Summary List   Subjective Functional Status/Changes:  []  No changes reported     Pt reports she is feeling great with no pain. Able to do all ADLs with no difficulty.  Only complaint is decreased strength       OBJECTIVE  Modalities Rationale:     decrease inflammation and decrease pain to improve patient's ability to perform ADLs without pain     min [] Estim, type/location:                                      []  att     []  unatt     []  w/US     []  w/ice    []  w/heat    min []  Mechanical Traction: type/lbs                   []  pro   []  sup   []  int   []  cont    []  before manual    []  after manual    min []  Ultrasound, settings/location:      min []  Iontophoresis w/ dexamethasone, location:                                               []  take home patch       []  in clinic    min []  Ice     []  Heat    location/position:     min []  Vasopneumatic Device, press/temp:     min []  Other:    [] Skin assessment post-treatment (if applicable):    []  intact    []  redness- no adverse reaction     []redness - adverse reaction:        22 min Therapeutic Exercise:  [x]  See flow sheet   Rationale:      increase strength and improve coordination to improve the patients ability to perform ADLs without pain       7 min Manual Therapy: PROM to L shoulder   Rationale:      increase ROM and increase tissue extensibility to improve patient's ability to perform ADLs without pain      11 min Therapeutic Activity: [x]  See flow sheet   Rationale:    lifting, carrying, pushing to improve the patients ability to perform ADLs pain free    Billed With/As:   [x] TE   [] TA   [] Neuro   [] Self Care Patient Education: [x] Review HEP    [] Progressed/Changed HEP based on:   [] positioning   [] body mechanics   [] transfers   [] heat/ice application    [] other:        Other Objective/Functional Measures:    PROM WNL   Post Treatment Pain Level (on 0 to 10) scale:   0  / 10     ASSESSMENT  Assessment/Changes in Function:     Pt able to perform all ADLs with no issue     []  See Progress Note/Recertification      Progress toward goals / Updated goals:    Met FOTO goal     PLAN  []  Upgrade activities as tolerated YES Continue plan of care   [x]  Discharge due to : Met goals   []  Other:      Therapist: Jayden Grubbs DPT     Date: 5/28/2020 Time: 10:35 AM        Future Appointments   Date Time Provider Marsha Bill   5/28/2020 11:00 AM [facility]   6/11/2020  1:00 PM Jamie Shaw  [address]    6/16/2020  9:45 AM MARK Partida 70

## 2020-06-04 NOTE — PROGRESS NOTES
[address] [place] [address] PHYSICAL THERAPY AT [address], [address] [place] [address]  Phone: [phone]  Fax: [phone] SUMMARY  Patient Name: Tamica Singletary : 1980   Treatment/Medical Diagnosis: Left shoulder pain [M25.512]   Referral Source: Andre Castle,*     Date of Initial Visit: 3/17/2020 Attended Visits: 28 Missed Visits: -     SUMMARY OF TREATMENT  PT consisted of manual therapy techniques, therapeutic exercises, and modalities to improve strength, improve mobility, decrease pain, and improve overall function. CURRENT STATUS  Pt was s/p L RCR 3/16/2020. Pt was seen for a total of 28 visits. Pt has AROM/PROM WNL. Pt has no pain. Pt is able to perform all ADLs with no discomfort except ADLs that require lifting heavy objects. Pt is independent with HEP and is able to self progress current exercises. Goal/Measure of Progress Goal Met? 1. Increase FOTO score to >/= 75 to show overall improvement in function   Status at last Eval: 62 Current Status: 69 Progressing   2. Pt will be independent with HEP in preparation for D/c   Status at last Eval: Not independent Current Status: independent yes     RECOMMENDATIONS  Discontinue therapy. Progressing towards or have reached established goals. If you have any questions/comments please contact us directly at [phone]. Thank you for allowing us to assist in the care of your patient. Therapist Signature: Nany Allen Date: 2020     Time: 8:31 AM       NOTE TO PHYSICIAN:  Your patient's insurance requires this discharge note be signed and returned. PLEASE COMPLETE THE ORDERS BELOW AND RETURN TO:  [facility] PHYSICAL THERAPY    ___ I have read the above report and request that my patient be discharged from therapy.      Physician Signature:        Date:       Time:

## 2022-12-21 ENCOUNTER — OFFICE VISIT (OUTPATIENT)
Dept: ORTHOPEDIC SURGERY | Age: 42
End: 2022-12-21
Payer: MEDICAID

## 2022-12-21 VITALS — WEIGHT: 170 LBS | HEIGHT: 67 IN | TEMPERATURE: 97.1 F | BODY MASS INDEX: 26.68 KG/M2

## 2022-12-21 DIAGNOSIS — M25.561 RIGHT KNEE PAIN, UNSPECIFIED CHRONICITY: ICD-10-CM

## 2022-12-21 DIAGNOSIS — Z98.890 S/P ACL RECONSTRUCTION: Primary | ICD-10-CM

## 2022-12-21 NOTE — PROGRESS NOTES
Dyllan Cueva  1980   Chief Complaint   Patient presents with    Knee Pain      Rt         HISTORY OF PRESENT ILLNESS  Dyllan Cueva is a 40 y.o. female who presents today for evaluation of right knee. Patient rates pain as 5/10 today. Pain has been present for 1 year. She states that around 1 year ago she \"rolled\" her ankle and knee and has had persistent knee pain since then. Notes a buckling sensation and feels like the knee \"slips\" when pivoting and endorses clicking sensation. She notes previous surgery on the right knee in 1998 performed in [place]. She then had a subsequent ACL reconstruction on that knee, with a total of 2 previous surgeries on the right knee. Denies giving way sensation. Of note, patient is also s/p left shoulder arthroscopic rotator cuff repair on 3/16/2020, is doing well with this. Patient denies any fever, chills, chest pain, shortness of breath or calf pain. The remainder of the review of systems is negative. There are no new illness or injuries to report since last seen in the office. There are no changes to medications, allergies, family or social history. PHYSICAL EXAM:   Visit Vitals  Temp 97.1 °F (36.2 °C) (Temporal)   Ht 5' 6.5\" (1.689 m)   Wt 170 lb (77.1 kg)   BMI 27.03 kg/m²     The patient is a well-developed, well-nourished female   in no acute distress. The patient is alert and oriented times three. The patient is alert and oriented times three. Mood and affect are normal.  LYMPHATIC: lymph nodes are not enlarged and are within normal limits  SKIN: normal in color and non tender to palpation. There are no bruises or abrasions noted. NEUROLOGICAL: Motor sensory exam is within normal limits. Reflexes are equal bilaterally.  There is normal sensation to pinprick and light touch  MUSCULOSKELETAL:  Examination Right knee   Skin Intact   Range of motion    Effusion +   Medial joint line tenderness +   Lateral joint line tenderness -   Tenderness Pes Bursa -   Tenderness insertion MCL -   Tenderness insertion LCL -   Warners -   Patella crepitus +   Patella grind -   Lachman +   Pivot shift -   Anterior drawer -   Posterior drawer -   Varus stress -   Valgus stress -   Neurovascular Intact   Calf Swelling and Tenderness to Palpation -   Nima's Test -   Hamstring Cord Tightness -        IMAGING: XR of the right knee with 2 views obtained in the office dated 12/21/2022 was reviewed and read by Dr. Catherine Kelly: Mild decreased joint space in the medial compartment with metallic implants femoral side and interference screw on the tibial side from previous ACL surgery      IMPRESSION:      ICD-10-CM ICD-9-CM    1. S/P ACL reconstruction  Z98.890 V45.89       2. Right knee pain, unspecified chronicity  M25.561 719.46 AMB POC XRAY, KNEE; 1/2 VIEWS           PLAN:   1. Pt presents today with right knee pain with hx of ACL reconstruction and we will be ordering a right knee MRI to assess the ACL graft. Patient was provided with physician-directed home exercise program in the office today. Risk factors include: n/a  2. No ultrasound exam indicated today  3. No cortisone injection indicated today   4. No Physical/Occupational Therapy indicated today  5. Yes diagnostic test indicated today: MRI R KNEE  6. No durable medical equipment indicated today  7. No referral indicated today   8. No medications indicated today:   9. No Narcotic indicated today      RTC following MRI      Scribed by Kathrin Argueta) as dictated by Brie Garcia MD    I, Dr. Brie Garcia, confirm that all documentation is accurate.     Brie Garcia M.D.   [facility]

## 2023-01-31 ENCOUNTER — HOSPITAL ENCOUNTER (OUTPATIENT)
Age: 43
Discharge: HOME | End: 2023-01-31
Attending: ORTHOPAEDIC SURGERY
Payer: MEDICAID

## 2023-01-31 DIAGNOSIS — M25.561 RIGHT KNEE PAIN, UNSPECIFIED CHRONICITY: ICD-10-CM

## 2023-01-31 DIAGNOSIS — Z98.890 S/P ACL RECONSTRUCTION: ICD-10-CM

## 2023-01-31 PROCEDURE — 73721 MRI JNT OF LWR EXTRE W/O DYE: CPT

## 2023-02-06 ENCOUNTER — OFFICE VISIT (OUTPATIENT)
Dept: ORTHOPEDIC SURGERY | Age: 43
End: 2023-02-06
Payer: MEDICAID

## 2023-02-06 VITALS — BODY MASS INDEX: 26.68 KG/M2 | HEIGHT: 67 IN | TEMPERATURE: 97.7 F | WEIGHT: 170 LBS

## 2023-02-06 DIAGNOSIS — S83.241A TEAR OF MEDIAL MENISCUS OF RIGHT KNEE, CURRENT, UNSPECIFIED TEAR TYPE, INITIAL ENCOUNTER: Primary | ICD-10-CM

## 2023-02-06 PROCEDURE — 99214 OFFICE O/P EST MOD 30 MIN: CPT | Performed by: ORTHOPAEDIC SURGERY

## 2023-04-03 NOTE — PATIENT INSTRUCTIONS
Dr. Radha Hicks Knee Arthroscopy Surgery    What is the surgery? This is an outpatient procedure at either [facility] 82 or [address] will be completely asleep for procedure. Dr. Radha Hicks will make 2 small incisions in your knee. He will take a tour of your knee with the camera and then address the meniscal tear(s). We will be able to evaluate if any arthritis in your knee but this surgery is not to treat your arthritis. Total surgery takes about 25-30 mins     What can you expect after surgery? You will have a bulky dressing on your knee that you can remove 2 days after surgery. You will be able to shower 2 days after surgery but no soaking in a bath, hot tub, ocean or pool x 2 weeks to allow for full wound healing. No special brace is needed. You will be on crutches or a walker when you leave the hospital. You can place weight on your leg as tolerated starting immediately. You are usually on crutches or your walker for about 4-5 days. Even though you can place weight on your leg, we recommend no walking or standing longer than 10mins for the first week. We will gradually increase your activities after that point. Dr. Radha Hicks will start physical therapy for you when you return for your 1 week post op apt  It will take your 6-8 weeks to fully recover from your surgery. When can I return to work? Most patients return to desk work only after 1-2 weeks. We recommend no prolonged walking or standing, climbing, kneeling, or crawling x 6-8 weeks. Not all knee arthroscopies are the same. The specifics of your individual case will be discussed at length with you by Dr. Radha Hicks and his Physician Assistant. Lynnette Ryder  Surgical Coordinator  [address] [place] [address], [facility] Kathrynangela Katie  [email or web address]  P: [phone]  F: [phone]

## 2023-04-04 ENCOUNTER — OFFICE VISIT (OUTPATIENT)
Age: 43
End: 2023-04-04

## 2023-04-04 VITALS
WEIGHT: 170 LBS | HEIGHT: 67 IN | DIASTOLIC BLOOD PRESSURE: 71 MMHG | SYSTOLIC BLOOD PRESSURE: 105 MMHG | BODY MASS INDEX: 26.68 KG/M2

## 2023-04-04 DIAGNOSIS — S83.241A OTHER TEAR OF MEDIAL MENISCUS, CURRENT INJURY, RIGHT KNEE, INITIAL ENCOUNTER: Primary | ICD-10-CM

## 2023-04-04 RX ORDER — HYDROCODONE BITARTRATE AND ACETAMINOPHEN 7.5; 325 MG/1; MG/1
1 TABLET ORAL EVERY 4 HOURS PRN
Qty: 40 TABLET | Refills: 0 | Status: SHIPPED | OUTPATIENT
Start: 2023-04-04 | End: 2023-04-11

## 2023-04-04 NOTE — PROGRESS NOTES
Lateral joint line tenderness  -        Tenderness Pes Bursa  -     Tenderness insertion MCL  -     Tenderness insertion LCL  -     Concepcion's  -        Patella crepitus  +     Patella grind  -     Lachman  +     Pivot shift  -     Anterior drawer  -     Posterior drawer  -     Varus stress  -     Valgus stress  -        Neurovascular  Intact        Calf Swelling and Tenderness to Palpation  -     Harshil's Test  -        Hamstring Cord Tightness  -       NEUROVASCULAR: Sensation intact to light touch and strength grossly intact and symmetrical. No nystagmus. Positive distal pulses and capillary refill. DVT ASSESSMENT:  There is not calf tenderness. No evidence of DVT seen on physical exam.  MOTOR: In tact  PSYCH: Alert an oriented to person, place and time. Mood, memory, affect, behavior and judgment normal       RADIOGRAPHS & DIAGNOSTIC STUDIES:     MRI/xray reveals :       IMAGING:    MRI of right knee dated 1/31/2023 was reviewed and read by Dr. Jose Knutson:    IMPRESSION:  1. ACL graft grossly intact with slightly steep inclination. Suspect anterior   scarring. 2. Subtle multidirectional tearing in the posterior horn medial meniscus with   posterior medial meniscal cyst   3. Grade 2 chondrosis in the anterior lateral femoral condyle. 4. Trace joint effusion and popliteal cyst    XR of the right knee with 2 views obtained in the office dated 12/21/2022 was reviewed and read by Dr. Jose Knutson: Mild decreased joint  space in the medial compartment with metallic implants femoral side and interference screw on the tibial side from previous ACL surgery      LABS:   None needed    ASSESSMENT:       Encounter Diagnosis   Name Primary? Other tear of medial meniscus, current injury, right knee, initial encounter Yes       PLAN:     Again, the alternatives, risks, and complications, as well as expected outcome were discussed.  The patient understands and agrees to proceed with right knee arthroscopic partial medial

## 2023-05-01 ENCOUNTER — OFFICE VISIT (OUTPATIENT)
Age: 43
End: 2023-05-01

## 2023-05-01 VITALS — HEIGHT: 66 IN | WEIGHT: 186 LBS | BODY MASS INDEX: 29.89 KG/M2

## 2023-05-01 DIAGNOSIS — S83.241A OTHER TEAR OF MEDIAL MENISCUS, CURRENT INJURY, RIGHT KNEE, INITIAL ENCOUNTER: Primary | ICD-10-CM

## 2023-05-01 PROCEDURE — 99024 POSTOP FOLLOW-UP VISIT: CPT | Performed by: PHYSICIAN ASSISTANT

## 2023-05-01 NOTE — PROGRESS NOTES
Patient: Erlin Donahue  YOB: 1980       HISTORY:  The patient presents for reevaluation of her right knee status post arthroscopic patial medial menisectomy with chondrocalcinosis on 4/17/23. Patient is improved, states pain is a 0 out of 10.  she has not gone to physical therapy. Patient denies any fever, chills, chest pain, shortness of breath or calf pain. The remainder of the review of systems is negative. There are no new illness or injuries to report since last seen in the office. No changes in medications, allergies, social or family history. PHYSICAL EXAMINATION:    There were no vitals taken for this visit. The patient is a well-developed, well-nourished female in no acute distress. The patient is alert and oriented times three. The patient appears to be well groomed. Mood and affect are normal.   ORTHOPEDIC EXAM of right knee: Inspection: Effusion not present,  incisions clean, dry intact, sutures in place  TTP: none  Range of motion: 0-100 flexion  Stability: Stable  Strength: 5/5  2+ distal pulses    IMPRESSION:  Status post right knee arthroscopic partial medial menisectomy with chondrocalcinosis. PLAN: Incisions cleaned. Surgery was discussed at length today. Stressed to patient that nothing causes an increase in pain or swelling. Patient is weight bearing as tolerated. OK to d/c use of crutches/walker if still utilizing. Will set up with physical therapy. Patient does not request refill of pain medication. Patient will follow up in 4 weeks if neeeded.     DENNY [facility]